# Patient Record
Sex: FEMALE | URBAN - NONMETROPOLITAN AREA
[De-identification: names, ages, dates, MRNs, and addresses within clinical notes are randomized per-mention and may not be internally consistent; named-entity substitution may affect disease eponyms.]

---

## 2024-07-03 ENCOUNTER — APPOINTMENT (RX ONLY)
Dept: URBAN - NONMETROPOLITAN AREA CLINIC 3 | Facility: CLINIC | Age: 74
Setting detail: DERMATOLOGY
End: 2024-07-03

## 2024-07-03 DIAGNOSIS — L81.4 OTHER MELANIN HYPERPIGMENTATION: ICD-10-CM

## 2024-07-03 DIAGNOSIS — L73.8 OTHER SPECIFIED FOLLICULAR DISORDERS: ICD-10-CM

## 2024-07-03 PROCEDURE — ? COUNSELING

## 2024-07-03 PROCEDURE — ? TREATMENT REGIMEN

## 2024-07-03 PROCEDURE — 99203 OFFICE O/P NEW LOW 30 MIN: CPT

## 2025-03-06 ENCOUNTER — HOSPITAL ENCOUNTER (OUTPATIENT)
Dept: RADIOLOGY | Facility: MEDICAL CENTER | Age: 75
End: 2025-03-06
Payer: COMMERCIAL

## 2025-03-07 ENCOUNTER — HOSPITAL ENCOUNTER (INPATIENT)
Facility: MEDICAL CENTER | Age: 75
LOS: 3 days | DRG: 603 | End: 2025-03-10
Attending: STUDENT IN AN ORGANIZED HEALTH CARE EDUCATION/TRAINING PROGRAM
Payer: MEDICARE

## 2025-03-07 DIAGNOSIS — R22.1 NECK SWELLING: ICD-10-CM

## 2025-03-07 DIAGNOSIS — L02.11 NECK ABSCESS: ICD-10-CM

## 2025-03-07 DIAGNOSIS — R22.1 NECK MASS: ICD-10-CM

## 2025-03-07 PROBLEM — G43.909 MIGRAINES: Status: ACTIVE | Noted: 2025-03-07

## 2025-03-07 PROBLEM — E03.9 HYPOTHYROIDISM: Status: ACTIVE | Noted: 2025-03-07

## 2025-03-07 LAB
ALBUMIN SERPL BCP-MCNC: 4.1 G/DL (ref 3.2–4.9)
ALBUMIN/GLOB SERPL: 1 G/DL
ALP SERPL-CCNC: 117 U/L (ref 30–99)
ALT SERPL-CCNC: 16 U/L (ref 2–50)
ANION GAP SERPL CALC-SCNC: 10 MMOL/L (ref 7–16)
APTT PPP: 27.6 SEC (ref 24.7–36)
AST SERPL-CCNC: 20 U/L (ref 12–45)
BASOPHILS # BLD AUTO: 0.5 % (ref 0–1.8)
BASOPHILS # BLD: 0.06 K/UL (ref 0–0.12)
BILIRUB SERPL-MCNC: 0.3 MG/DL (ref 0.1–1.5)
BUN SERPL-MCNC: 19 MG/DL (ref 8–22)
CALCIUM ALBUM COR SERPL-MCNC: 9.3 MG/DL (ref 8.5–10.5)
CALCIUM SERPL-MCNC: 9.4 MG/DL (ref 8.5–10.5)
CHLORIDE SERPL-SCNC: 105 MMOL/L (ref 96–112)
CO2 SERPL-SCNC: 25 MMOL/L (ref 20–33)
CREAT SERPL-MCNC: 0.71 MG/DL (ref 0.5–1.4)
EOSINOPHIL # BLD AUTO: 0.48 K/UL (ref 0–0.51)
EOSINOPHIL NFR BLD: 4.1 % (ref 0–6.9)
ERYTHROCYTE [DISTWIDTH] IN BLOOD BY AUTOMATED COUNT: 44.7 FL (ref 35.9–50)
GFR SERPLBLD CREATININE-BSD FMLA CKD-EPI: 89 ML/MIN/1.73 M 2
GLOBULIN SER CALC-MCNC: 4 G/DL (ref 1.9–3.5)
GLUCOSE SERPL-MCNC: 88 MG/DL (ref 65–99)
HCT VFR BLD AUTO: 42.5 % (ref 37–47)
HGB BLD-MCNC: 13.7 G/DL (ref 12–16)
IMM GRANULOCYTES # BLD AUTO: 0.05 K/UL (ref 0–0.11)
IMM GRANULOCYTES NFR BLD AUTO: 0.4 % (ref 0–0.9)
INR PPP: 1.14 (ref 0.87–1.13)
LACTATE SERPL-SCNC: 1 MMOL/L (ref 0.5–2)
LYMPHOCYTES # BLD AUTO: 1.76 K/UL (ref 1–4.8)
LYMPHOCYTES NFR BLD: 14.9 % (ref 22–41)
MCH RBC QN AUTO: 29.9 PG (ref 27–33)
MCHC RBC AUTO-ENTMCNC: 32.2 G/DL (ref 32.2–35.5)
MCV RBC AUTO: 92.8 FL (ref 81.4–97.8)
MONOCYTES # BLD AUTO: 0.93 K/UL (ref 0–0.85)
MONOCYTES NFR BLD AUTO: 7.9 % (ref 0–13.4)
NEUTROPHILS # BLD AUTO: 8.56 K/UL (ref 1.82–7.42)
NEUTROPHILS NFR BLD: 72.2 % (ref 44–72)
NRBC # BLD AUTO: 0 K/UL
NRBC BLD-RTO: 0 /100 WBC (ref 0–0.2)
PLATELET # BLD AUTO: 239 K/UL (ref 164–446)
PMV BLD AUTO: 9 FL (ref 9–12.9)
POTASSIUM SERPL-SCNC: 3.8 MMOL/L (ref 3.6–5.5)
PROT SERPL-MCNC: 8.1 G/DL (ref 6–8.2)
PROTHROMBIN TIME: 14.6 SEC (ref 12–14.6)
RBC # BLD AUTO: 4.58 M/UL (ref 4.2–5.4)
SODIUM SERPL-SCNC: 140 MMOL/L (ref 135–145)
WBC # BLD AUTO: 11.8 K/UL (ref 4.8–10.8)

## 2025-03-07 PROCEDURE — A9270 NON-COVERED ITEM OR SERVICE: HCPCS

## 2025-03-07 PROCEDURE — 36415 COLL VENOUS BLD VENIPUNCTURE: CPT

## 2025-03-07 PROCEDURE — 99285 EMERGENCY DEPT VISIT HI MDM: CPT

## 2025-03-07 PROCEDURE — 700111 HCHG RX REV CODE 636 W/ 250 OVERRIDE (IP): Performed by: STUDENT IN AN ORGANIZED HEALTH CARE EDUCATION/TRAINING PROGRAM

## 2025-03-07 PROCEDURE — 770020 HCHG ROOM/CARE - TELE (206)

## 2025-03-07 PROCEDURE — 83605 ASSAY OF LACTIC ACID: CPT

## 2025-03-07 PROCEDURE — 700102 HCHG RX REV CODE 250 W/ 637 OVERRIDE(OP)

## 2025-03-07 PROCEDURE — 85025 COMPLETE CBC W/AUTO DIFF WBC: CPT

## 2025-03-07 PROCEDURE — 85730 THROMBOPLASTIN TIME PARTIAL: CPT

## 2025-03-07 PROCEDURE — 99223 1ST HOSP IP/OBS HIGH 75: CPT | Mod: AI,GC

## 2025-03-07 PROCEDURE — 85610 PROTHROMBIN TIME: CPT

## 2025-03-07 PROCEDURE — 80053 COMPREHEN METABOLIC PANEL: CPT

## 2025-03-07 PROCEDURE — 96365 THER/PROPH/DIAG IV INF INIT: CPT

## 2025-03-07 RX ORDER — PROPRANOLOL HYDROCHLORIDE 10 MG/1
40 TABLET ORAL EVERY MORNING
Status: DISCONTINUED | OUTPATIENT
Start: 2025-03-08 | End: 2025-03-10 | Stop reason: HOSPADM

## 2025-03-07 RX ORDER — HYDROMORPHONE HYDROCHLORIDE 1 MG/ML
0.25 INJECTION, SOLUTION INTRAMUSCULAR; INTRAVENOUS; SUBCUTANEOUS
Status: DISCONTINUED | OUTPATIENT
Start: 2025-03-07 | End: 2025-03-07

## 2025-03-07 RX ORDER — CLINDAMYCIN PHOSPHATE 600 MG/50ML
600 INJECTION, SOLUTION INTRAVENOUS ONCE
Status: COMPLETED | OUTPATIENT
Start: 2025-03-07 | End: 2025-03-07

## 2025-03-07 RX ORDER — OXYCODONE HYDROCHLORIDE 5 MG/1
5 TABLET ORAL
Refills: 0 | Status: DISCONTINUED | OUTPATIENT
Start: 2025-03-07 | End: 2025-03-10 | Stop reason: HOSPADM

## 2025-03-07 RX ORDER — CLINDAMYCIN PHOSPHATE 600 MG/50ML
600 INJECTION, SOLUTION INTRAVENOUS EVERY 8 HOURS
Status: DISCONTINUED | OUTPATIENT
Start: 2025-03-08 | End: 2025-03-10 | Stop reason: HOSPADM

## 2025-03-07 RX ORDER — ACETAMINOPHEN 500 MG
1000 TABLET ORAL EVERY 6 HOURS PRN
Status: DISCONTINUED | OUTPATIENT
Start: 2025-03-13 | End: 2025-03-10 | Stop reason: HOSPADM

## 2025-03-07 RX ORDER — IBUPROFEN 800 MG/1
800 TABLET, FILM COATED ORAL 3 TIMES DAILY PRN
Status: DISCONTINUED | OUTPATIENT
Start: 2025-03-13 | End: 2025-03-07

## 2025-03-07 RX ORDER — IBUPROFEN 800 MG/1
800 TABLET, FILM COATED ORAL 3 TIMES DAILY
Status: DISCONTINUED | OUTPATIENT
Start: 2025-03-07 | End: 2025-03-09

## 2025-03-07 RX ORDER — ZOLMITRIPTAN 2.5 MG/1
5 TABLET, ORALLY DISINTEGRATING ORAL
Status: DISCONTINUED | OUTPATIENT
Start: 2025-03-07 | End: 2025-03-10 | Stop reason: HOSPADM

## 2025-03-07 RX ORDER — TOPIRAMATE 50 MG/1
50 TABLET, FILM COATED ORAL 2 TIMES DAILY
COMMUNITY

## 2025-03-07 RX ORDER — ACETAMINOPHEN 500 MG
1000 TABLET ORAL EVERY 6 HOURS PRN
Status: DISCONTINUED | OUTPATIENT
Start: 2025-03-13 | End: 2025-03-07

## 2025-03-07 RX ORDER — ZOLMITRIPTAN 5 MG/1
5 TABLET, FILM COATED ORAL
COMMUNITY

## 2025-03-07 RX ORDER — AMOXICILLIN 250 MG
2 CAPSULE ORAL EVERY EVENING
Status: DISCONTINUED | OUTPATIENT
Start: 2025-03-07 | End: 2025-03-10 | Stop reason: HOSPADM

## 2025-03-07 RX ORDER — ACETAMINOPHEN 500 MG
1000 TABLET ORAL EVERY 6 HOURS
Status: DISCONTINUED | OUTPATIENT
Start: 2025-03-08 | End: 2025-03-07

## 2025-03-07 RX ORDER — LABETALOL HYDROCHLORIDE 5 MG/ML
10 INJECTION, SOLUTION INTRAVENOUS EVERY 4 HOURS PRN
Status: DISCONTINUED | OUTPATIENT
Start: 2025-03-07 | End: 2025-03-10 | Stop reason: HOSPADM

## 2025-03-07 RX ORDER — ONDANSETRON 4 MG/1
4 TABLET, ORALLY DISINTEGRATING ORAL EVERY 4 HOURS PRN
Status: DISCONTINUED | OUTPATIENT
Start: 2025-03-07 | End: 2025-03-10 | Stop reason: HOSPADM

## 2025-03-07 RX ORDER — OMEGA-3 FATTY ACIDS/FISH OIL 300-1000MG
400-600 CAPSULE ORAL EVERY 6 HOURS PRN
COMMUNITY

## 2025-03-07 RX ORDER — HYDROMORPHONE HYDROCHLORIDE 1 MG/ML
0.25 INJECTION, SOLUTION INTRAMUSCULAR; INTRAVENOUS; SUBCUTANEOUS
Status: DISCONTINUED | OUTPATIENT
Start: 2025-03-07 | End: 2025-03-10 | Stop reason: HOSPADM

## 2025-03-07 RX ORDER — LEVOTHYROXINE SODIUM 50 UG/1
50 TABLET ORAL
Status: DISCONTINUED | OUTPATIENT
Start: 2025-03-08 | End: 2025-03-10 | Stop reason: HOSPADM

## 2025-03-07 RX ORDER — BUTALBITAL, ACETAMINOPHEN, CAFFEINE AND CODEINE PHOSPHATE 300; 50; 40; 30 MG/1; MG/1; MG/1; MG/1
1 CAPSULE ORAL EVERY 6 HOURS PRN
COMMUNITY

## 2025-03-07 RX ORDER — OXYCODONE HYDROCHLORIDE 5 MG/1
2.5 TABLET ORAL
Refills: 0 | Status: DISCONTINUED | OUTPATIENT
Start: 2025-03-07 | End: 2025-03-10 | Stop reason: HOSPADM

## 2025-03-07 RX ORDER — IBUPROFEN 800 MG/1
800 TABLET, FILM COATED ORAL 3 TIMES DAILY
Status: DISCONTINUED | OUTPATIENT
Start: 2025-03-08 | End: 2025-03-07

## 2025-03-07 RX ORDER — LEVOTHYROXINE SODIUM 50 UG/1
50 TABLET ORAL
COMMUNITY

## 2025-03-07 RX ORDER — IBUPROFEN 800 MG/1
800 TABLET, FILM COATED ORAL 3 TIMES DAILY PRN
Status: DISCONTINUED | OUTPATIENT
Start: 2025-03-12 | End: 2025-03-09

## 2025-03-07 RX ORDER — PROPRANOLOL HYDROCHLORIDE 40 MG/1
40-80 TABLET ORAL 2 TIMES DAILY
Status: DISCONTINUED | OUTPATIENT
Start: 2025-03-07 | End: 2025-03-07

## 2025-03-07 RX ORDER — ONDANSETRON 2 MG/ML
4 INJECTION INTRAMUSCULAR; INTRAVENOUS EVERY 4 HOURS PRN
Status: DISCONTINUED | OUTPATIENT
Start: 2025-03-07 | End: 2025-03-10 | Stop reason: HOSPADM

## 2025-03-07 RX ORDER — TOPIRAMATE 25 MG/1
50 TABLET, FILM COATED ORAL 2 TIMES DAILY
Status: DISCONTINUED | OUTPATIENT
Start: 2025-03-07 | End: 2025-03-10 | Stop reason: HOSPADM

## 2025-03-07 RX ORDER — ACETAMINOPHEN 500 MG
1000 TABLET ORAL EVERY 6 HOURS
Status: DISCONTINUED | OUTPATIENT
Start: 2025-03-08 | End: 2025-03-10 | Stop reason: HOSPADM

## 2025-03-07 RX ORDER — OXYCODONE HYDROCHLORIDE 5 MG/1
2.5 TABLET ORAL
Refills: 0 | Status: DISCONTINUED | OUTPATIENT
Start: 2025-03-07 | End: 2025-03-07

## 2025-03-07 RX ORDER — PROPRANOLOL HYDROCHLORIDE 40 MG/1
40-80 TABLET ORAL 2 TIMES DAILY
COMMUNITY

## 2025-03-07 RX ORDER — ACETAMINOPHEN 325 MG/1
650 TABLET ORAL EVERY 6 HOURS PRN
Status: DISCONTINUED | OUTPATIENT
Start: 2025-03-07 | End: 2025-03-07

## 2025-03-07 RX ORDER — POLYETHYLENE GLYCOL 3350 17 G/17G
1 POWDER, FOR SOLUTION ORAL
Status: DISCONTINUED | OUTPATIENT
Start: 2025-03-07 | End: 2025-03-10 | Stop reason: HOSPADM

## 2025-03-07 RX ORDER — PROPRANOLOL HYDROCHLORIDE 40 MG/1
80 TABLET ORAL EVERY EVENING
Status: DISCONTINUED | OUTPATIENT
Start: 2025-03-07 | End: 2025-03-10 | Stop reason: HOSPADM

## 2025-03-07 RX ORDER — ACETAMINOPHEN/DIPHENHYDRAMINE 500MG-25MG
2 TABLET ORAL
COMMUNITY

## 2025-03-07 RX ORDER — OXYCODONE HYDROCHLORIDE 5 MG/1
5 TABLET ORAL
Refills: 0 | Status: DISCONTINUED | OUTPATIENT
Start: 2025-03-07 | End: 2025-03-07

## 2025-03-07 RX ADMIN — CLINDAMYCIN IN 5 PERCENT DEXTROSE 600 MG: 12 INJECTION, SOLUTION INTRAVENOUS at 19:05

## 2025-03-07 RX ADMIN — ACETAMINOPHEN 650 MG: 325 TABLET ORAL at 21:21

## 2025-03-07 RX ADMIN — TOPIRAMATE 50 MG: 25 TABLET, FILM COATED ORAL at 21:30

## 2025-03-07 RX ADMIN — OXYCODONE 5 MG: 5 TABLET ORAL at 23:49

## 2025-03-07 RX ADMIN — PROPRANOLOL HYDROCHLORIDE 80 MG: 40 TABLET ORAL at 21:29

## 2025-03-07 SDOH — ECONOMIC STABILITY: TRANSPORTATION INSECURITY
IN THE PAST 12 MONTHS, HAS LACK OF RELIABLE TRANSPORTATION KEPT YOU FROM MEDICAL APPOINTMENTS, MEETINGS, WORK OR FROM GETTING THINGS NEEDED FOR DAILY LIVING?: YES

## 2025-03-07 SDOH — ECONOMIC STABILITY: TRANSPORTATION INSECURITY
IN THE PAST 12 MONTHS, HAS THE LACK OF TRANSPORTATION KEPT YOU FROM MEDICAL APPOINTMENTS OR FROM GETTING MEDICATIONS?: YES

## 2025-03-07 ASSESSMENT — COGNITIVE AND FUNCTIONAL STATUS - GENERAL
SUGGESTED CMS G CODE MODIFIER MOBILITY: CH
MOBILITY SCORE: 24
DAILY ACTIVITIY SCORE: 24
SUGGESTED CMS G CODE MODIFIER DAILY ACTIVITY: CH

## 2025-03-07 ASSESSMENT — ENCOUNTER SYMPTOMS
NAUSEA: 0
SHORTNESS OF BREATH: 0
DEPRESSION: 0
BACK PAIN: 0
COUGH: 0
DIARRHEA: 0
MYALGIAS: 0
ORTHOPNEA: 0
VOMITING: 0
CHILLS: 0
TREMORS: 0
HEMOPTYSIS: 0
HEADACHES: 0
NECK PAIN: 1
WEIGHT LOSS: 0
SPUTUM PRODUCTION: 0
ABDOMINAL PAIN: 0
DOUBLE VISION: 0
PHOTOPHOBIA: 0
CLAUDICATION: 0
BLURRED VISION: 0
HEARTBURN: 0
DIZZINESS: 0
FEVER: 0
TINGLING: 0
PALPITATIONS: 0

## 2025-03-07 ASSESSMENT — LIFESTYLE VARIABLES
ALCOHOL_USE: NO
EVER HAD A DRINK FIRST THING IN THE MORNING TO STEADY YOUR NERVES TO GET RID OF A HANGOVER: NO
HAVE PEOPLE ANNOYED YOU BY CRITICIZING YOUR DRINKING: NO
TOTAL SCORE: 0
HAVE YOU EVER FELT YOU SHOULD CUT DOWN ON YOUR DRINKING: NO
HAVE YOU EVER FELT YOU SHOULD CUT DOWN ON YOUR DRINKING: NO
TOTAL SCORE: 0
TOTAL SCORE: 0
DOES PATIENT WANT TO STOP DRINKING: NO
ON A TYPICAL DAY WHEN YOU DRINK ALCOHOL HOW MANY DRINKS DO YOU HAVE: 0
DOES PATIENT WANT TO STOP DRINKING: NO
ON A TYPICAL DAY WHEN YOU DRINK ALCOHOL HOW MANY DRINKS DO YOU HAVE: 0
AVERAGE NUMBER OF DAYS PER WEEK YOU HAVE A DRINK CONTAINING ALCOHOL: 0
ALCOHOL_USE: NO
TOTAL SCORE: 0
EVER FELT BAD OR GUILTY ABOUT YOUR DRINKING: NO
EVER FELT BAD OR GUILTY ABOUT YOUR DRINKING: NO
TOTAL SCORE: 0
HAVE PEOPLE ANNOYED YOU BY CRITICIZING YOUR DRINKING: NO
HOW MANY TIMES IN THE PAST YEAR HAVE YOU HAD 5 OR MORE DRINKS IN A DAY: 0
AVERAGE NUMBER OF DAYS PER WEEK YOU HAVE A DRINK CONTAINING ALCOHOL: 0
CONSUMPTION TOTAL: INCOMPLETE
EVER HAD A DRINK FIRST THING IN THE MORNING TO STEADY YOUR NERVES TO GET RID OF A HANGOVER: NO
TOTAL SCORE: 0
CONSUMPTION TOTAL: NEGATIVE

## 2025-03-07 ASSESSMENT — SOCIAL DETERMINANTS OF HEALTH (SDOH)
WITHIN THE PAST 12 MONTHS, YOU WORRIED THAT YOUR FOOD WOULD RUN OUT BEFORE YOU GOT THE MONEY TO BUY MORE: NEVER TRUE
WITHIN THE LAST YEAR, HAVE YOU BEEN AFRAID OF YOUR PARTNER OR EX-PARTNER?: NO
WITHIN THE PAST 12 MONTHS, THE FOOD YOU BOUGHT JUST DIDN'T LAST AND YOU DIDN'T HAVE MONEY TO GET MORE: NEVER TRUE
WITHIN THE LAST YEAR, HAVE TO BEEN RAPED OR FORCED TO HAVE ANY KIND OF SEXUAL ACTIVITY BY YOUR PARTNER OR EX-PARTNER?: NO
WITHIN THE LAST YEAR, HAVE YOU BEEN HUMILIATED OR EMOTIONALLY ABUSED IN OTHER WAYS BY YOUR PARTNER OR EX-PARTNER?: NO
WITHIN THE LAST YEAR, HAVE YOU BEEN KICKED, HIT, SLAPPED, OR OTHERWISE PHYSICALLY HURT BY YOUR PARTNER OR EX-PARTNER?: NO
IN THE PAST 12 MONTHS, HAS THE ELECTRIC, GAS, OIL, OR WATER COMPANY THREATENED TO SHUT OFF SERVICE IN YOUR HOME?: NO

## 2025-03-07 ASSESSMENT — PAIN DESCRIPTION - PAIN TYPE
TYPE: ACUTE PAIN

## 2025-03-07 ASSESSMENT — FIBROSIS 4 INDEX: FIB4 SCORE: 1.55

## 2025-03-07 ASSESSMENT — PATIENT HEALTH QUESTIONNAIRE - PHQ9
1. LITTLE INTEREST OR PLEASURE IN DOING THINGS: NOT AT ALL
SUM OF ALL RESPONSES TO PHQ9 QUESTIONS 1 AND 2: 0
2. FEELING DOWN, DEPRESSED, IRRITABLE, OR HOPELESS: NOT AT ALL

## 2025-03-08 LAB
ALBUMIN SERPL BCP-MCNC: 3.7 G/DL (ref 3.2–4.9)
ALBUMIN/GLOB SERPL: 1 G/DL
ALP SERPL-CCNC: 122 U/L (ref 30–99)
ALT SERPL-CCNC: 12 U/L (ref 2–50)
ANION GAP SERPL CALC-SCNC: 11 MMOL/L (ref 7–16)
AST SERPL-CCNC: 21 U/L (ref 12–45)
BASOPHILS # BLD AUTO: 0.6 % (ref 0–1.8)
BASOPHILS # BLD: 0.07 K/UL (ref 0–0.12)
BILIRUB SERPL-MCNC: 0.3 MG/DL (ref 0.1–1.5)
BUN SERPL-MCNC: 17 MG/DL (ref 8–22)
CALCIUM ALBUM COR SERPL-MCNC: 9.2 MG/DL (ref 8.5–10.5)
CALCIUM SERPL-MCNC: 9 MG/DL (ref 8.5–10.5)
CHLORIDE SERPL-SCNC: 106 MMOL/L (ref 96–112)
CO2 SERPL-SCNC: 22 MMOL/L (ref 20–33)
CREAT SERPL-MCNC: 0.66 MG/DL (ref 0.5–1.4)
EOSINOPHIL # BLD AUTO: 0.37 K/UL (ref 0–0.51)
EOSINOPHIL NFR BLD: 3.1 % (ref 0–6.9)
ERYTHROCYTE [DISTWIDTH] IN BLOOD BY AUTOMATED COUNT: 43.4 FL (ref 35.9–50)
GFR SERPLBLD CREATININE-BSD FMLA CKD-EPI: 91 ML/MIN/1.73 M 2
GLOBULIN SER CALC-MCNC: 3.8 G/DL (ref 1.9–3.5)
GLUCOSE SERPL-MCNC: 113 MG/DL (ref 65–99)
HCT VFR BLD AUTO: 38.6 % (ref 37–47)
HGB BLD-MCNC: 12.9 G/DL (ref 12–16)
IMM GRANULOCYTES # BLD AUTO: 0.07 K/UL (ref 0–0.11)
IMM GRANULOCYTES NFR BLD AUTO: 0.6 % (ref 0–0.9)
LYMPHOCYTES # BLD AUTO: 1.23 K/UL (ref 1–4.8)
LYMPHOCYTES NFR BLD: 10.4 % (ref 22–41)
MCH RBC QN AUTO: 30.1 PG (ref 27–33)
MCHC RBC AUTO-ENTMCNC: 33.4 G/DL (ref 32.2–35.5)
MCV RBC AUTO: 90.2 FL (ref 81.4–97.8)
MONOCYTES # BLD AUTO: 0.86 K/UL (ref 0–0.85)
MONOCYTES NFR BLD AUTO: 7.3 % (ref 0–13.4)
NEUTROPHILS # BLD AUTO: 9.18 K/UL (ref 1.82–7.42)
NEUTROPHILS NFR BLD: 78 % (ref 44–72)
NRBC # BLD AUTO: 0 K/UL
NRBC BLD-RTO: 0 /100 WBC (ref 0–0.2)
PLATELET # BLD AUTO: 193 K/UL (ref 164–446)
PMV BLD AUTO: 9.2 FL (ref 9–12.9)
POTASSIUM SERPL-SCNC: 3.8 MMOL/L (ref 3.6–5.5)
PROT SERPL-MCNC: 7.5 G/DL (ref 6–8.2)
RBC # BLD AUTO: 4.28 M/UL (ref 4.2–5.4)
SODIUM SERPL-SCNC: 139 MMOL/L (ref 135–145)
WBC # BLD AUTO: 11.8 K/UL (ref 4.8–10.8)

## 2025-03-08 PROCEDURE — 770001 HCHG ROOM/CARE - MED/SURG/GYN PRIV*

## 2025-03-08 PROCEDURE — 700111 HCHG RX REV CODE 636 W/ 250 OVERRIDE (IP)

## 2025-03-08 PROCEDURE — 99232 SBSQ HOSP IP/OBS MODERATE 35: CPT | Performed by: INTERNAL MEDICINE

## 2025-03-08 PROCEDURE — 85025 COMPLETE CBC W/AUTO DIFF WBC: CPT

## 2025-03-08 PROCEDURE — 700105 HCHG RX REV CODE 258

## 2025-03-08 PROCEDURE — 700102 HCHG RX REV CODE 250 W/ 637 OVERRIDE(OP)

## 2025-03-08 PROCEDURE — 36415 COLL VENOUS BLD VENIPUNCTURE: CPT

## 2025-03-08 PROCEDURE — 80053 COMPREHEN METABOLIC PANEL: CPT

## 2025-03-08 PROCEDURE — A9270 NON-COVERED ITEM OR SERVICE: HCPCS

## 2025-03-08 RX ORDER — SODIUM CHLORIDE, SODIUM LACTATE, POTASSIUM CHLORIDE, CALCIUM CHLORIDE 600; 310; 30; 20 MG/100ML; MG/100ML; MG/100ML; MG/100ML
INJECTION, SOLUTION INTRAVENOUS CONTINUOUS
Status: DISCONTINUED | OUTPATIENT
Start: 2025-03-08 | End: 2025-03-08

## 2025-03-08 RX ADMIN — ACETAMINOPHEN 1000 MG: 500 TABLET ORAL at 05:46

## 2025-03-08 RX ADMIN — CLINDAMYCIN IN 5 PERCENT DEXTROSE 600 MG: 12 INJECTION, SOLUTION INTRAVENOUS at 05:59

## 2025-03-08 RX ADMIN — CLINDAMYCIN IN 5 PERCENT DEXTROSE 600 MG: 12 INJECTION, SOLUTION INTRAVENOUS at 22:16

## 2025-03-08 RX ADMIN — TOPIRAMATE 50 MG: 25 TABLET, FILM COATED ORAL at 17:49

## 2025-03-08 RX ADMIN — PROPRANOLOL HYDROCHLORIDE 80 MG: 40 TABLET ORAL at 17:48

## 2025-03-08 RX ADMIN — HYDROMORPHONE HYDROCHLORIDE 0.25 MG: 1 INJECTION, SOLUTION INTRAMUSCULAR; INTRAVENOUS; SUBCUTANEOUS at 02:05

## 2025-03-08 RX ADMIN — TOPIRAMATE 50 MG: 25 TABLET, FILM COATED ORAL at 05:47

## 2025-03-08 RX ADMIN — SODIUM CHLORIDE, POTASSIUM CHLORIDE, SODIUM LACTATE AND CALCIUM CHLORIDE: 600; 310; 30; 20 INJECTION, SOLUTION INTRAVENOUS at 10:00

## 2025-03-08 RX ADMIN — IBUPROFEN 800 MG: 800 TABLET, FILM COATED ORAL at 16:27

## 2025-03-08 RX ADMIN — PROPRANOLOL HYDROCHLORIDE 40 MG: 10 TABLET ORAL at 05:49

## 2025-03-08 RX ADMIN — IBUPROFEN 800 MG: 800 TABLET, FILM COATED ORAL at 09:00

## 2025-03-08 RX ADMIN — CLINDAMYCIN IN 5 PERCENT DEXTROSE 600 MG: 12 INJECTION, SOLUTION INTRAVENOUS at 14:51

## 2025-03-08 RX ADMIN — LEVOTHYROXINE SODIUM 50 MCG: 0.05 TABLET ORAL at 05:49

## 2025-03-08 RX ADMIN — IBUPROFEN 800 MG: 800 TABLET, FILM COATED ORAL at 03:37

## 2025-03-08 RX ADMIN — ACETAMINOPHEN 1000 MG: 500 TABLET ORAL at 23:17

## 2025-03-08 RX ADMIN — IBUPROFEN 800 MG: 800 TABLET, FILM COATED ORAL at 23:17

## 2025-03-08 SDOH — ECONOMIC STABILITY: TRANSPORTATION INSECURITY
IN THE PAST 12 MONTHS, HAS THE LACK OF TRANSPORTATION KEPT YOU FROM MEDICAL APPOINTMENTS OR FROM GETTING MEDICATIONS?: NO

## 2025-03-08 SDOH — ECONOMIC STABILITY: TRANSPORTATION INSECURITY
IN THE PAST 12 MONTHS, HAS LACK OF RELIABLE TRANSPORTATION KEPT YOU FROM MEDICAL APPOINTMENTS, MEETINGS, WORK OR FROM GETTING THINGS NEEDED FOR DAILY LIVING?: NO

## 2025-03-08 ASSESSMENT — SOCIAL DETERMINANTS OF HEALTH (SDOH)
IN THE PAST 12 MONTHS, HAS THE ELECTRIC, GAS, OIL, OR WATER COMPANY THREATENED TO SHUT OFF SERVICE IN YOUR HOME?: NO
WITHIN THE PAST 12 MONTHS, THE FOOD YOU BOUGHT JUST DIDN'T LAST AND YOU DIDN'T HAVE MONEY TO GET MORE: NEVER TRUE
WITHIN THE PAST 12 MONTHS, YOU WORRIED THAT YOUR FOOD WOULD RUN OUT BEFORE YOU GOT THE MONEY TO BUY MORE: NEVER TRUE

## 2025-03-08 ASSESSMENT — ENCOUNTER SYMPTOMS
PSYCHIATRIC NEGATIVE: 1
FEVER: 0
GASTROINTESTINAL NEGATIVE: 1
CONSTITUTIONAL NEGATIVE: 1
CHILLS: 0
RESPIRATORY NEGATIVE: 1
NEUROLOGICAL NEGATIVE: 1
CARDIOVASCULAR NEGATIVE: 1
SINUS PAIN: 0
EYES NEGATIVE: 1
MUSCULOSKELETAL NEGATIVE: 1
SORE THROAT: 1
WEIGHT LOSS: 0

## 2025-03-08 ASSESSMENT — FIBROSIS 4 INDEX: FIB4 SCORE: 2.32

## 2025-03-08 ASSESSMENT — PAIN DESCRIPTION - PAIN TYPE
TYPE: ACUTE PAIN
TYPE: CHRONIC PAIN

## 2025-03-08 NOTE — ASSESSMENT & PLAN NOTE
Outside CT scan and Ultrasound of the neck describing a circumscribed mass lesion with central fluid which could be a abscess versus lymph node versus cancer  ENT (Dr. Fernandes) consulted  -Continue clindamycin for now  -Plan for FNA biopsy on 3/10.  If neoplasm ruled out, ENT will consider I&D  -Pain control

## 2025-03-08 NOTE — PROGRESS NOTES
Patient transferred from ED via gurney. Patient alert and oriented x 4, on RA. Admission assessment and 4 eyes completed with Kristen WALTON , RN. VS stable, tele monitor on. All fall precautions in place. Bed locked and on lowest position. Call light and personal belongings within reach. Patient updated regarding plan of care. All needs tended,

## 2025-03-08 NOTE — PROGRESS NOTES
[facility] Internal Medicine Daily Progress Note    Date of Service  3/8/2025    [facility] Team: [facility] Team   Attending: Titus Nieves M.d.  Senior Resident: Dr. Arsen Carrillo  Intern:  Dr. Marina Evangelista  Contact Number: [phone]    Chief Complaint  Marilyn Feliciano is a 74 y.o. female admitted 3/7/2025 with suspected neck abscess    Hospital Course  Pt is a 74/F with a PMH of hypothyroidism on synthroid who presents for a right-sided neck mass. She was evaluated at [facility] ED a few days ago for a mass that progressively grew on the right side of her neck for approximately 2 weeks. US and CT neck done there showed a possible neck abscess versus neoplasm. She was started on Unasyn then referred to [facility] for further workup from an ENT provider. While in our ED she was started on Clindamycin due to a Unasyn shortage and an ENT provider was consulted by the ED physician. WBC was slightly elevated at 11.8.    Interval Problem Update  Currently, pt complains of neck pain that is tender to touch. She complains of a mild sore throat and decreased appetite, but she denies fevers, difficulty swallowing or drinking, recent staph infection, weight loss, difficulty opening her mouth, tooth infections, or decreased range of motion of her neck.  - Dr. Fernandes consulted by ED provider who plans to see the pt today. He recommended for us to get workup started with an IR-guided FNAB, which will most likely be done on Monday due to no Pathologist on weekends.  - For now continue Clindamycin 600mg q8  - Regular diet for now until NPO on Sunday midnight    I have discussed this patient's plan of care and discharge plan at IDT rounds today with Case Management, Nursing, Nursing leadership, and other members of the IDT team.    Consultants/Specialty  N/A    Code Status  Full Code    Disposition  The patient is not medically cleared for discharge to home or a post-acute facility.      I have placed the appropriate orders for  post-discharge needs.    Review of Systems  Review of Systems   Constitutional: Negative.  Negative for chills, fever, malaise/fatigue and weight loss.   HENT:  Positive for sore throat. Negative for sinus pain.    Eyes: Negative.    Respiratory: Negative.     Cardiovascular: Negative.    Gastrointestinal: Negative.    Genitourinary: Negative.    Musculoskeletal: Negative.    Skin: Negative.    Neurological: Negative.    Endo/Heme/Allergies: Negative.    Psychiatric/Behavioral: Negative.          Physical Exam  Temp:  [36.1 °C (97 °F)-36.5 °C (97.7 °F)] 36.1 °C (97 °F)  Pulse:  [60-69] 60  Resp:  [16-18] 18  BP: (115-156)/(55-83) 156/74  SpO2:  [94 %-99 %] 97 %    Physical Exam  HENT:      Head: Normocephalic.      Right Ear: External ear normal.      Left Ear: External ear normal.      Mouth/Throat:      Mouth: Mucous membranes are moist.      Pharynx: No oropharyngeal exudate or posterior oropharyngeal erythema.   Neck:      Comments: Tender area from anterolateral aspect of right side of the neck, extending to behind the ear that spans from her submandibular area down to right clavicle. (+) induration. No sinus tracts inspected  Cardiovascular:      Rate and Rhythm: Normal rate and regular rhythm.   Pulmonary:      Breath sounds: Normal breath sounds.   Abdominal:      General: Abdomen is flat.      Palpations: Abdomen is soft.   Musculoskeletal:         General: Normal range of motion.      Cervical back: Normal range of motion. Tenderness present.   Lymphadenopathy:      Cervical: No cervical adenopathy.   Skin:     Findings: Erythema present.   Neurological:      Mental Status: She is alert and oriented to person, place, and time.   Psychiatric:         Mood and Affect: Mood normal.         Fluids    Intake/Output Summary (Last 24 hours) at 3/8/2025 1708  Last data filed at 3/8/2025 0600  Gross per 24 hour   Intake 50 ml   Output --   Net 50 ml       Laboratory  Recent Labs     03/07/25  1853 03/08/25  7393    WBC 11.8* 11.8*   RBC 4.58 4.28   HEMOGLOBIN 13.7 12.9   HEMATOCRIT 42.5 38.6   MCV 92.8 90.2   MCH 29.9 30.1   MCHC 32.2 33.4   RDW 44.7 43.4   PLATELETCT 239 193   MPV 9.0 9.2     Recent Labs     03/07/25  1853 03/08/25  0248   SODIUM 140 139   POTASSIUM 3.8 3.8   CHLORIDE 105 106   CO2 25 22   GLUCOSE 88 113*   BUN 19 17   CREATININE 0.71 0.66   CALCIUM 9.4 9.0     Recent Labs     03/07/25  1853   APTT 27.6   INR 1.14*               Imaging  [id number] FILM ULTRASOUND   Final Result      CT-OUTSIDE IMAGES-CT NECK   Final Result      IR-FNA BIOPSY W/ FLUORO GUIDANCE    (Results Pending)        Assessment/Plan  Problem Representation:    * Neck mass- (present on admission)  Assessment & Plan  Outside CT scan and Ultrasound of the neck describing a circumscribed mass lesion with central fluid which could be a abscess versus lymph node versus cancer  - ENT (Dr. Fernandes) following who plans to see the pt today. He recommended for us to get workup started with an IR-guided FNAB, which will most likely be done on Monday due to no Pathologist on weekends.  - For now continue Clindamycin 600mg q8 (due to Unasyn shortage)  - Regular diet for now until NPO on Sunday midnight  - Plan for IR-guided FNAB on Monday    Migraines  Assessment & Plan  Patient not presenting with any migraines  - Continue home pain medications    Hypothyroidism  Assessment & Plan  - Continue home Levothyroxine 50mcg         VTE prophylaxis: SCDs/TEDs    I have performed a physical exam and reviewed and updated ROS and Plan today (3/8/2025). In review of yesterday's note (3/7/2025), there are no changes except as documented above.

## 2025-03-08 NOTE — ED NOTES
Med rec completed per patient at bedside. Dispense history via Surescripts not available at time of med rec.    Allergies reviewed with patient.    Outpatient antibiotics within the last 30 days: NONE.    ANTICOAGULANTS: NONE.    Preferred pharmacy: [facility] in [place].

## 2025-03-08 NOTE — PROGRESS NOTES
Telemetry Report:      Rhythm:     SB/SR  Heart Rate: 58-67  Ectopy:      Rare PVC, RARE PAC       NV:  0. 15          QRS:       0.08  QT:   0.43        Per Telestrip from Monitor Room

## 2025-03-08 NOTE — H&P
[facility] Internal Medicine History & Physical Note    Date of Service  3/7/2025    Attending: Shayan Antony D.o.  Senior Resident: Delgado Pearl   Contact Number: [phone]    Primary Care Physician  No primary care provider on file.    Consultants  ENT      Code Status  Full Code    Chief Complaint  Chief Complaint   Patient presents with    Sent by MD [facility]AB[facility] Jimenez transfer from last night for ENT follow up. Neck swelling for 2 weeks.        History of Presenting Illness (HPI):   Marilyn Feliciano is a 74 y.o. female who presented 3/7/2025 with past medical history of migraines, hypothyroidism.  The patient came as a transfer from [place] due to presenting a mass over the right side of her neck, the patient states that the mass started increasing in size approximately 2 weeks ago, and states that initially she had a cold back in November 2024 and she had some lymph nodes inflammation at that time which never returned to the normal size.  The patient states some tenderness to the palpation over the mass and some discomfort whenever she extends her neck otherwise the patient denies any other symptoms like shortness of breath, trouble with swallowing, fever, chills, nausea, vomiting.    The patient was evaluated in the ED:   CBC with white blood cell 11.8, neutrophils 72  -Outside CT scan and Ultrasound of the neck describing a circumscribed mass lesion with central fluid which could be a abscess versus lymph node versus cancer.    ED doctor discussed with ENT specialist which will come to evaluate the patient most likely during a.m., also recommended to start on antibiotics he was started on clindamycin (due to Unasyn shortage)    I discussed the plan of care with patient.    Review of Systems  Review of Systems   Constitutional:  Negative for chills, fever, malaise/fatigue and weight loss.   HENT:  Negative for ear discharge, ear pain, hearing loss, nosebleeds and tinnitus.    Eyes:  Negative for blurred  vision, double vision and photophobia.   Respiratory:  Negative for cough, hemoptysis, sputum production and shortness of breath.    Cardiovascular:  Negative for chest pain, palpitations, orthopnea and claudication.   Gastrointestinal:  Negative for abdominal pain, diarrhea, heartburn, nausea and vomiting.   Genitourinary:  Negative for dysuria, frequency and urgency.   Musculoskeletal:  Positive for neck pain (in the right side of the neck due to the mass). Negative for back pain, joint pain and myalgias.   Neurological:  Negative for dizziness, tingling, tremors and headaches.   Psychiatric/Behavioral:  Negative for depression.        Past Medical History   has no past medical history on file.    Surgical History   has no past surgical history on file.     Family History  family history is not on file.   Family history reviewed with patient.       Allergies  No Known Allergies    Medications  Prior to Admission Medications   Prescriptions Last Dose Informant Patient Reported? Taking?   Butalbital-APAP-Caff-Cod (FIORICET/CODEINE) -94-30 MG Cap 3/7/2025 at 12:00 AM Patient Yes Yes   Sig: Take 1 Capsule by mouth every 6 hours as needed (Migraine).   Ibuprofen (ADVIL LIQUI-GELS MINIS) 200 MG Cap 3/7/2025 at 12:00 PM Patient Yes Yes   Sig: Take 400-600 mg by mouth every 6 hours as needed (Mild to Moderate Pain). 2 to 3 capsules = 400 to 600 mg.   diphenhydrAMINE-APAP, sleep, (TYLENOL PM EXTRA STRENGTH)  MG Tab 3/5/2025 Evening Patient Yes Yes   Sig: Take 2 Tablets by mouth at bedtime as needed (Mild Pain, Sleep).   levothyroxine (SYNTHROID) 50 MCG Tab 3/7/2025 at  9:00 AM Patient Yes Yes   Sig: Take 50 mcg by mouth every morning on an empty stomach.   propranolol (INDERAL) 40 MG Tab 3/7/2025 at  9:00 AM Patient Yes Yes   Sig: Take 40-80 mg by mouth 2 times a day. Take 1 tablet (40 mg) in the morning and 2 tablets (80 mg) in the evening.   topiramate (TOPAMAX) 50 MG tablet 3/7/2025 at  9:00 AM Patient Yes  Yes   Sig: Take 50 mg by mouth 2 times a day.   zolmitriptan (ZOMIG) 5 MG tablet 3/5/2025 Patient Yes Yes   Sig: Take 5 mg by mouth 1 time a day as needed for Migraine.      Facility-Administered Medications: None       Physical Exam  Temp:  [36.4 °C (97.6 °F)] 36.4 °C (97.6 °F)  Pulse:  [60-66] 65  Resp:  [16-18] 16  BP: (126-133)/(70-77) 126/77  SpO2:  [97 %-99 %] 99 %  Blood Pressure : 129/73   Temperature: 36.4 °C (97.6 °F)   Pulse: 60   Respiration: 18   Pulse Oximetry: 97 %       Physical Exam  Constitutional:       General: She is not in acute distress.     Appearance: She is not ill-appearing.   Eyes:      General:         Right eye: No discharge.         Left eye: No discharge.   Neck:      Vascular: No carotid bruit.   Cardiovascular:      Rate and Rhythm: Normal rate.      Heart sounds: No murmur heard.     No friction rub. No gallop.   Pulmonary:      Effort: No respiratory distress.      Breath sounds: No stridor. No wheezing.   Abdominal:      General: There is no distension.      Palpations: There is no mass.      Tenderness: There is no abdominal tenderness.   Musculoskeletal:         General: No swelling, tenderness, deformity or signs of injury.      Cervical back: No rigidity. Tenderness: over right jimi eof her neck to extension and deep palpation.     Right lower leg: No edema.      Left lower leg: No edema.   Lymphadenopathy:      Cervical: No cervical adenopathy.   Skin:     Capillary Refill: Capillary refill takes less than 2 seconds.      Coloration: Skin is not jaundiced or pale.      Findings: No bruising.   Neurological:      Mental Status: She is oriented to person, place, and time.         Laboratory:  Recent Labs     03/07/25  1853   WBC 11.8*   RBC 4.58   HEMOGLOBIN 13.7   HEMATOCRIT 42.5   MCV 92.8   MCH 29.9   MCHC 32.2   RDW 44.7   PLATELETCT 239   MPV 9.0     Recent Labs     03/07/25  1853   SODIUM 140   POTASSIUM 3.8   CHLORIDE 105   CO2 25   GLUCOSE 88   BUN 19   CREATININE 0.71  "  CALCIUM 9.4     Recent Labs     03/07/25  1853   ALTSGPT 16   ASTSGOT 20   ALKPHOSPHAT 117*   TBILIRUBIN 0.3   GLUCOSE 88     Recent Labs     03/07/25  1853   APTT 27.6   INR 1.14*     No results for input(s): \"NTPROBNP\" in the last 72 hours.      No results for input(s): \"TROPONINT\" in the last 72 hours.    Imaging:  [id number] FILM ULTRASOUND   Final Result      CT-OUTSIDE IMAGES-CT NECK   Final Result          X-Ray:  I have personally reviewed the images and compared with prior images.    Assessment/Plan:  Problem Representation:   I anticipate this patient will require at least two midnights for appropriate medical management, necessitating inpatient admission because neck mass    Patient will need a Telemetry bed on MEDICAL service .  The need is secondary to neck mass.    * Neck mass- (present on admission)  Assessment & Plan  Outside CT scan and Ultrasound of the neck describing a circumscribed mass lesion with central fluid which could be a abscess versus lymph node versus cancer  -ENT evaluation a.m.  -We will start her on clindamycin (due to antibiotics shortage)  -Telemetry evaluating O2 saturation    Hypothyroidism  Assessment & Plan  Continue home medication which levothyroxine      Migraines  Assessment & Plan  Patient not presenting any migraines at this point restart home medications        VTE prophylaxis: SCDs/TEDs    "

## 2025-03-08 NOTE — CARE PLAN
The patient is Stable - Low risk of patient condition declining or worsening    Shift Goals  Clinical Goals: ENT referral, pain management  Patient Goals: plan of care, updates  Family Goals: updtaes    Progress made toward(s) clinical / shift goals:     Problem: Pain - Standard  Goal: Alleviation of pain or a reduction in pain to the patient’s comfort goal  Outcome: Progressing  Note: Document pain using the appropriate pain scale per order or unit policy   Educate and implement non-pharmacologic comfort measures (i.e. relaxation, distraction, massage, cold/heat therapy, etc.)   Pain management medications as ordered   Reassess pain after pain med administration per policy   If opiods administered assess patient's response to pain medication is appropriate per POSS sedation scale   Follow pain management plan developed in collaboration with patient and interdisciplinary team     Patient reported right neck pain, Oxycodone PRN and Dilaudid PRN as ordered. Encouraged non pharmacological intervention.      Problem: Knowledge Deficit - Standard  Goal: Patient and family/care givers will demonstrate understanding of plan of care, disease process/condition, diagnostic tests and medications  Outcome: Progressing  Note: Patient updated regarding plan of care and current treatment. All questions answered. Pt voiced understanding.          Patient is not progressing towards the following goals:

## 2025-03-08 NOTE — ED TRIAGE NOTES
Chief Complaint   Patient presents with    Sent by MD BABS Jimenez transfer from last night for ENT follow up. Neck swelling for 2 weeks.      Pt ambulatory to triage for above complaint. Transfer notes report neck abscess. Pt denies difficulty swallowing or maintaining airway at this time. Pt took ibuprofen prior to arrival and reports pain is well controlled.

## 2025-03-08 NOTE — PROGRESS NOTES
Patient reported right neck pain of 6-7/10 in pain scale, reached out to , ordered set of pain medication, however patient refuses narcotic meds and requested for toradol instead. Dr. Santamaria Notified, ordered  Ibuprofen instead.   Upon giving ibuprofen, patient refuses and asked for oxycodone instead for increasing pain to 8-9/10 in pain scale. Oxycodone  5 mg tab given.

## 2025-03-08 NOTE — ED PROVIDER NOTES
"ED Provider Note    CHIEF COMPLAINT  Chief Complaint   Patient presents with    Sent by MD [facility] Jimenez transfer from last night for ENT follow up. Neck swelling for 2 weeks.        EXTERNAL RECORDS REVIEWED  External ED Note records from [facility] emergency department where patient received a CT scan and ultrasound showing right sided neck abscess versus neoplasm.  She was given Unasyn and transfer was arranged by private vehicle    HPI/ROS  LIMITATION TO HISTORY   Select: : None  OUTSIDE HISTORIAN(S):      Marilyn Feliciano is a 74 y.o. female who presents from outside hospital for suspected neck abscess.  Patient has had worsening swelling and pain over the past week to the right side of her neck.  No dysphagia, trouble breathing, fevers.    PAST MEDICAL HISTORY       SURGICAL HISTORY  patient denies any surgical history    FAMILY HISTORY  History reviewed. No pertinent family history.    SOCIAL HISTORY  Social History     Tobacco Use    Smoking status: Never    Smokeless tobacco: Never   Substance and Sexual Activity    Alcohol use: Not Currently    Drug use: Not Currently    Sexual activity: Not on file       CURRENT MEDICATIONS  Home Medications       Reviewed by Bharat Baugh (Pharmacy Tech) on 03/07/25 at 1940  Med List Status: Complete     Medication Last Dose Status   Butalbital-APAP-Caff-Cod (FIORICET/CODEINE) -70-30 MG Cap 3/7/2025 Active   diphenhydrAMINE-APAP, sleep, (TYLENOL PM EXTRA STRENGTH)  MG Tab 3/5/2025 Active   Ibuprofen (ADVIL LIQUI-GELS MINIS) 200 MG Cap 3/7/2025 Active   levothyroxine (SYNTHROID) 50 MCG Tab 3/7/2025 Active   propranolol (INDERAL) 40 MG Tab 3/7/2025 Active   topiramate (TOPAMAX) 50 MG tablet 3/7/2025 Active   zolmitriptan (ZOMIG) 5 MG tablet 3/5/2025 Active                    ALLERGIES  No Known Allergies    PHYSICAL EXAM  VITAL SIGNS: /63   Pulse 63   Temp 36.2 °C (97.2 °F) (Temporal)   Resp 17   Ht 1.626 m (5' 4\")   Wt 71.2 kg (156 lb " 15.5 oz)   SpO2 99%   BMI 26.94 kg/m²    Physical Exam  Vitals and nursing note reviewed.   Constitutional:       Appearance: She is well-developed.   HENT:      Head: Normocephalic.      Mouth/Throat:      Comments: No oropharyngeal swelling or mass lesion  Eyes:      Extraocular Movements: Extraocular movements intact.      Pupils: Pupils are equal, round, and reactive to light.   Neck:      Comments: Extending from zone 1 to zone 3 of the right lateral neck there is an area of erythema, tenderness and induration.  Cardiovascular:      Rate and Rhythm: Normal rate and regular rhythm.   Pulmonary:      Effort: Pulmonary effort is normal.      Breath sounds: Normal breath sounds. No wheezing, rhonchi or rales.   Abdominal:      Palpations: Abdomen is soft.      Tenderness: There is no abdominal tenderness.   Musculoskeletal:      Cervical back: Normal range of motion.   Neurological:      Mental Status: She is alert and oriented to person, place, and time.         EKG/LABS  Labs Reviewed   CBC WITH DIFFERENTIAL - Abnormal; Notable for the following components:       Result Value    WBC 11.8 (*)     Neutrophils-Polys 72.20 (*)     Lymphocytes 14.90 (*)     Neutrophils (Absolute) 8.56 (*)     Monos (Absolute) 0.93 (*)     All other components within normal limits   COMP METABOLIC PANEL - Abnormal; Notable for the following components:    Alkaline Phosphatase 117 (*)     Globulin 4.0 (*)     All other components within normal limits   PROTHROMBIN TIME - Abnormal; Notable for the following components:    INR 1.14 (*)     All other components within normal limits   LACTIC ACID   APTT   ESTIMATED GFR         COURSE & MEDICAL DECISION MAKING    ASSESSMENT, COURSE AND PLAN  Care Narrative: 74-year-old female transfer from outside hospital for right sided neck abscess versus neoplasm.  Patient given Unasyn prior to transfer she did have a moderate leukocytosis.  Patient otherwise well-appearing on exam with normal vital  signs tolerating secretions well no imminent airway compromise no signs of intraoral swelling.  Case discussed with ENT who recommends admission and IV antibiotics and they will evaluate.  Case was then discussed with the hospitalist who has kindly agreed to admit for further management              ADDITIONAL PROBLEMS MANAGED  History reviewed. No pertinent past medical history.    DISPOSITION AND DISCUSSIONS  I have discussed management of the patient with the following physicians and BROCK's: Dr. Antony hospitalist, Dr. Fernandes ENT    Discussion of management with other Q or appropriate source(s):      Escalation of care considered, and ultimately not performed:    Barriers to care at this time, including but not limited to: .     Decision tools and prescription drugs considered including, but not limited to: Antibiotics clindamycin.    FINAL DIAGNOSIS  1. Neck abscess    2. Neck swelling         Electronically signed by: Hernan Arceo M.D., 3/7/2025 9:30 PM

## 2025-03-08 NOTE — PROGRESS NOTES
4 Eyes Skin Assessment Completed by ANTON Mcleod and ANTON Peterson.    Head WDL  Ears WDL  Nose WDL  Mouth WDL  Neck Redness, swellin on the right neck   Breast/Chest WDL  Shoulder Blades WDL  Spine WDL  (R) Arm/Elbow/Hand WDL  (L) Arm/Elbow/Hand WDL  Abdomen WDL  Groin WDL  Scrotum/Coccyx/Buttocks Redness and Blanching  (R) Leg WDL, dry   (L) Leg WDL, dry   (R) Heel/Foot/Toe Scab, redness   (L) Heel/Foot/Toe Redness and Blanching, scab on the left 5th toe           Devices In Places Tele Box, Blood Pressure Cuff, and Pulse Ox      Interventions In Place Barrier Cream    Possible Skin Injury No    Pictures Uploaded Into Epic Yes  Wound Consult Placed N/A  RN Wound Prevention Protocol Ordered Yes

## 2025-03-08 NOTE — ED NOTES
Pt to SMT via pt transport. Pt has all belongings at time of transfer. No belongings left in room after transfer. Family at bedside.

## 2025-03-09 LAB
ALBUMIN SERPL BCP-MCNC: 3.5 G/DL (ref 3.2–4.9)
ALBUMIN/GLOB SERPL: 0.9 G/DL
ALP SERPL-CCNC: 112 U/L (ref 30–99)
ALT SERPL-CCNC: 12 U/L (ref 2–50)
ANION GAP SERPL CALC-SCNC: 10 MMOL/L (ref 7–16)
AST SERPL-CCNC: 23 U/L (ref 12–45)
BASOPHILS # BLD AUTO: 0.5 % (ref 0–1.8)
BASOPHILS # BLD: 0.07 K/UL (ref 0–0.12)
BILIRUB SERPL-MCNC: 0.3 MG/DL (ref 0.1–1.5)
BUN SERPL-MCNC: 18 MG/DL (ref 8–22)
CALCIUM ALBUM COR SERPL-MCNC: 9.4 MG/DL (ref 8.5–10.5)
CALCIUM SERPL-MCNC: 9 MG/DL (ref 8.5–10.5)
CHLORIDE SERPL-SCNC: 106 MMOL/L (ref 96–112)
CO2 SERPL-SCNC: 24 MMOL/L (ref 20–33)
CREAT SERPL-MCNC: 0.85 MG/DL (ref 0.5–1.4)
EOSINOPHIL # BLD AUTO: 0.46 K/UL (ref 0–0.51)
EOSINOPHIL NFR BLD: 3.6 % (ref 0–6.9)
ERYTHROCYTE [DISTWIDTH] IN BLOOD BY AUTOMATED COUNT: 45.1 FL (ref 35.9–50)
GFR SERPLBLD CREATININE-BSD FMLA CKD-EPI: 71 ML/MIN/1.73 M 2
GLOBULIN SER CALC-MCNC: 3.8 G/DL (ref 1.9–3.5)
GLUCOSE SERPL-MCNC: 122 MG/DL (ref 65–99)
HCT VFR BLD AUTO: 40.7 % (ref 37–47)
HGB BLD-MCNC: 13.3 G/DL (ref 12–16)
IMM GRANULOCYTES # BLD AUTO: 0.08 K/UL (ref 0–0.11)
IMM GRANULOCYTES NFR BLD AUTO: 0.6 % (ref 0–0.9)
LYMPHOCYTES # BLD AUTO: 1.24 K/UL (ref 1–4.8)
LYMPHOCYTES NFR BLD: 9.7 % (ref 22–41)
MCH RBC QN AUTO: 30.8 PG (ref 27–33)
MCHC RBC AUTO-ENTMCNC: 32.7 G/DL (ref 32.2–35.5)
MCV RBC AUTO: 94.2 FL (ref 81.4–97.8)
MONOCYTES # BLD AUTO: 1.08 K/UL (ref 0–0.85)
MONOCYTES NFR BLD AUTO: 8.5 % (ref 0–13.4)
NEUTROPHILS # BLD AUTO: 9.82 K/UL (ref 1.82–7.42)
NEUTROPHILS NFR BLD: 77.1 % (ref 44–72)
NRBC # BLD AUTO: 0 K/UL
NRBC BLD-RTO: 0 /100 WBC (ref 0–0.2)
PLATELET # BLD AUTO: 234 K/UL (ref 164–446)
PMV BLD AUTO: 9.1 FL (ref 9–12.9)
POTASSIUM SERPL-SCNC: 3.8 MMOL/L (ref 3.6–5.5)
PROT SERPL-MCNC: 7.3 G/DL (ref 6–8.2)
RBC # BLD AUTO: 4.32 M/UL (ref 4.2–5.4)
SODIUM SERPL-SCNC: 140 MMOL/L (ref 135–145)
WBC # BLD AUTO: 12.8 K/UL (ref 4.8–10.8)

## 2025-03-09 PROCEDURE — A9270 NON-COVERED ITEM OR SERVICE: HCPCS

## 2025-03-09 PROCEDURE — 80053 COMPREHEN METABOLIC PANEL: CPT

## 2025-03-09 PROCEDURE — 85025 COMPLETE CBC W/AUTO DIFF WBC: CPT

## 2025-03-09 PROCEDURE — 770001 HCHG ROOM/CARE - MED/SURG/GYN PRIV*

## 2025-03-09 PROCEDURE — 700102 HCHG RX REV CODE 250 W/ 637 OVERRIDE(OP)

## 2025-03-09 PROCEDURE — 36415 COLL VENOUS BLD VENIPUNCTURE: CPT

## 2025-03-09 PROCEDURE — 700111 HCHG RX REV CODE 636 W/ 250 OVERRIDE (IP)

## 2025-03-09 PROCEDURE — 99232 SBSQ HOSP IP/OBS MODERATE 35: CPT | Performed by: INTERNAL MEDICINE

## 2025-03-09 RX ORDER — IBUPROFEN 600 MG/1
600 TABLET, FILM COATED ORAL
Status: DISCONTINUED | OUTPATIENT
Start: 2025-03-09 | End: 2025-03-10 | Stop reason: HOSPADM

## 2025-03-09 RX ORDER — IBUPROFEN 600 MG/1
600 TABLET, FILM COATED ORAL 3 TIMES DAILY PRN
Status: DISCONTINUED | OUTPATIENT
Start: 2025-03-13 | End: 2025-03-10 | Stop reason: HOSPADM

## 2025-03-09 RX ADMIN — TOPIRAMATE 50 MG: 25 TABLET, FILM COATED ORAL at 06:09

## 2025-03-09 RX ADMIN — ACETAMINOPHEN 1000 MG: 500 TABLET ORAL at 23:13

## 2025-03-09 RX ADMIN — ACETAMINOPHEN 1000 MG: 500 TABLET ORAL at 18:04

## 2025-03-09 RX ADMIN — PROPRANOLOL HYDROCHLORIDE 40 MG: 10 TABLET ORAL at 06:10

## 2025-03-09 RX ADMIN — IBUPROFEN 800 MG: 800 TABLET, FILM COATED ORAL at 10:44

## 2025-03-09 RX ADMIN — LEVOTHYROXINE SODIUM 50 MCG: 0.05 TABLET ORAL at 06:09

## 2025-03-09 RX ADMIN — TOPIRAMATE 50 MG: 25 TABLET, FILM COATED ORAL at 18:07

## 2025-03-09 RX ADMIN — CLINDAMYCIN IN 5 PERCENT DEXTROSE 600 MG: 12 INJECTION, SOLUTION INTRAVENOUS at 06:09

## 2025-03-09 RX ADMIN — CLINDAMYCIN IN 5 PERCENT DEXTROSE 600 MG: 12 INJECTION, SOLUTION INTRAVENOUS at 14:45

## 2025-03-09 RX ADMIN — IBUPROFEN 600 MG: 600 TABLET, FILM COATED ORAL at 18:03

## 2025-03-09 RX ADMIN — ACETAMINOPHEN 1000 MG: 500 TABLET ORAL at 06:09

## 2025-03-09 RX ADMIN — PROPRANOLOL HYDROCHLORIDE 80 MG: 40 TABLET ORAL at 18:05

## 2025-03-09 RX ADMIN — CLINDAMYCIN IN 5 PERCENT DEXTROSE 600 MG: 12 INJECTION, SOLUTION INTRAVENOUS at 21:36

## 2025-03-09 RX ADMIN — ACETAMINOPHEN 1000 MG: 500 TABLET ORAL at 12:19

## 2025-03-09 ASSESSMENT — PAIN DESCRIPTION - PAIN TYPE
TYPE: ACUTE PAIN

## 2025-03-09 ASSESSMENT — ENCOUNTER SYMPTOMS
CHILLS: 0
MUSCULOSKELETAL NEGATIVE: 1
EYES NEGATIVE: 1
FEVER: 0
NEUROLOGICAL NEGATIVE: 1
SORE THROAT: 1
RESPIRATORY NEGATIVE: 1
SINUS PAIN: 0
PSYCHIATRIC NEGATIVE: 1
CONSTITUTIONAL NEGATIVE: 1
CARDIOVASCULAR NEGATIVE: 1
WEIGHT LOSS: 0
GASTROINTESTINAL NEGATIVE: 1

## 2025-03-09 NOTE — HOSPITAL COURSE
Pt is a 74/F with a PMH of hypothyroidism on synthroid who presents for a right-sided neck mass. She was evaluated at [facility] ED a few days ago for a mass that progressively grew on the right side of her neck for approximately 2 weeks. US and CT neck done there showed a possible neck abscess versus neoplasm. She was started on Unasyn then referred to [facility] for further workup from an ENT provider. While in our ED she was started on Clindamycin due to a Unasyn shortage and an ENT provider was consulted by the ED physician.  ENT recommends obtaining FNA first to rule out neoplasm and if it is not, they will consider I&D.

## 2025-03-09 NOTE — CONSULTS
Date of Service:   3/8/2025    PCP: No primary care provider on file.    CC:   right neck mass    HPI: This is a 74 y.o. female with  illness since November and progressive feeling of swelling in the neck.  In the past 2 weeks however this rapidly increased.  She has some pain and swelling in the right neck.  She was seen at an outside facility where CT scan was done which was available for my review.  My review did show that there was a significant soft tissue component to the neck mass but there is also some lucency indicating possible neoplasm with necrosis.  The patient is not a smoker.  She does not have a history of malignant neoplasm.    ROS: As above. The remainder of a complete review of systems is negative in all systems except as noted.    PMHx:  Active Ambulatory Problems     Diagnosis Date Noted    No Active Ambulatory Problems     Resolved Ambulatory Problems     Diagnosis Date Noted    No Resolved Ambulatory Problems     No Additional Past Medical History       SHx:  Social History     Socioeconomic History    Marital status: Not on file     Spouse name: Not on file    Number of children: Not on file    Years of education: Not on file    Highest education level: Not on file   Occupational History    Not on file   Tobacco Use    Smoking status: Never    Smokeless tobacco: Never   Substance and Sexual Activity    Alcohol use: Not Currently    Drug use: Not Currently    Sexual activity: Not on file   Other Topics Concern    Not on file   Social History Narrative    Not on file     Social Drivers of Health     Financial Resource Strain: Not on file   Food Insecurity: No Food Insecurity (3/8/2025)    Hunger Vital Sign     Worried About Running Out of Food in the Last Year: Never true     Ran Out of Food in the Last Year: Never true   Transportation Needs: No Transportation Needs (3/8/2025)    PRAPARE - Transportation     Lack of Transportation (Medical): No     Lack of Transportation (Non-Medical): No    Recent Concern: Transportation Needs - Unmet Transportation Needs (3/7/2025)    PRAPARE - Transportation     Lack of Transportation (Medical): Yes     Lack of Transportation (Non-Medical): Yes   Physical Activity: Not on file   Stress: Not on file   Social Connections: Not on file   Intimate Partner Violence: Not At Risk (3/7/2025)    Humiliation, Afraid, Rape, and Kick questionnaire     Fear of Current or Ex-Partner: No     Emotionally Abused: No     Physically Abused: No     Sexually Abused: No   Housing Stability: Low Risk  (3/8/2025)    Housing Stability Vital Sign     Unable to Pay for Housing in the Last Year: No     Number of Times Moved in the Last Year: 0     Homeless in the Last Year: No       FHx:  family history is not on file.    Allergies:  No Known Allergies    Medications:  No current facility-administered medications on file prior to encounter.     Current Outpatient Medications on File Prior to Encounter   Medication Sig Dispense Refill    propranolol (INDERAL) 40 MG Tab Take 40-80 mg by mouth 2 times a day. Take 1 tablet (40 mg) in the morning and 2 tablets (80 mg) in the evening.      topiramate (TOPAMAX) 50 MG tablet Take 50 mg by mouth 2 times a day.      levothyroxine (SYNTHROID) 50 MCG Tab Take 50 mcg by mouth every morning on an empty stomach.      Butalbital-APAP-Caff-Cod (FIORICET/CODEINE) -31-30 MG Cap Take 1 Capsule by mouth every 6 hours as needed (Migraine).      zolmitriptan (ZOMIG) 5 MG tablet Take 5 mg by mouth 1 time a day as needed for Migraine.      diphenhydrAMINE-APAP, sleep, (TYLENOL PM EXTRA STRENGTH)  MG Tab Take 2 Tablets by mouth at bedtime as needed (Mild Pain, Sleep).      Ibuprofen (ADVIL LIQUI-GELS MINIS) 200 MG Cap Take 400-600 mg by mouth every 6 hours as needed (Mild to Moderate Pain). 2 to 3 capsules = 400 to 600 mg.         Objective Exam:  Vitals:    03/08/25 0549 03/08/25 1615 03/08/25 1748 03/08/25 1853   BP: 124/55 (!) 156/74 127/74 132/61    Pulse: 64 60 69 (!) 54   Resp:  18  16   Temp:  36.1 °C (97 °F)  36.1 °C (97 °F)   TempSrc:  Temporal  Temporal   SpO2:  97%  97%   Weight:       Height:           General:   Well-developed well-nourished woman resting comfortably in bed  HEENT:  large right neck mass.  Firm.  The overlying skin is erythematous  Chest: .  Good excursion with respiration  CV:  regular rate and rhythm  Abd:  no abdominal distention noted  Ext:  no clubbing cyanosis or edema  Neuro:  patient alert and oriented and able to carry on a complex conversation without difficulty.  Skin:   Not diaphoretic, dry    Laboratory--reviewed personally and are as follows:  Lab Results   Component Value Date/Time    WBC 11.8 (H) 03/08/2025 02:48 AM    RBC 4.28 03/08/2025 02:48 AM    HEMOGLOBIN 12.9 03/08/2025 02:48 AM    HEMATOCRIT 38.6 03/08/2025 02:48 AM    MCV 90.2 03/08/2025 02:48 AM    MCH 30.1 03/08/2025 02:48 AM    MCHC 33.4 03/08/2025 02:48 AM    MPV 9.2 03/08/2025 02:48 AM    NEUTSPOLYS 78.00 (H) 03/08/2025 02:48 AM    LYMPHOCYTES 10.40 (L) 03/08/2025 02:48 AM    MONOCYTES 7.30 03/08/2025 02:48 AM    EOSINOPHILS 3.10 03/08/2025 02:48 AM    BASOPHILS 0.60 03/08/2025 02:48 AM      Lab Results   Component Value Date/Time    SODIUM 139 03/08/2025 02:48 AM    POTASSIUM 3.8 03/08/2025 02:48 AM    CHLORIDE 106 03/08/2025 02:48 AM    CO2 22 03/08/2025 02:48 AM    GLUCOSE 113 (H) 03/08/2025 02:48 AM    BUN 17 03/08/2025 02:48 AM    CREATININE 0.66 03/08/2025 02:48 AM      Lab Results   Component Value Date/Time    PROTHROMBTM 14.6 03/07/2025 06:53 PM    INR 1.14 (H) 03/07/2025 06:53 PM        Radiology   outside CT reviewed, please see above      Assessment/Plan:     patient with probable neoplasm of the neck.  I did note some thyroid abnormalities on her CT scan.  This may be a necrotic mass that has been secondarily superinfected or it may simply be a chronic abscess.  Await FNA to rule out neoplasm if it is not a neoplasm then we will incise and  drain the abscess.  If it is a neoplasm, then therapy will be guided by which type of neoplasm.  Principal Problem:    Neck mass (POA: Yes)  Active Problems:    Hypothyroidism (POA: Unknown)    Migraines (POA: Unknown)  Resolved Problems:    * No resolved hospital problems. *

## 2025-03-09 NOTE — CARE PLAN
The patient is Stable - Low risk of patient condition declining or worsening    Shift Goals  Clinical Goals: ENT referral, pain management  Patient Goals: plan of care, advance diet  Family Goals: updates    Progress made toward(s) clinical / shift goals:        Problem: Pain - Standard  Goal: Alleviation of pain or a reduction in pain to the patient’s comfort goal  Outcome: Progressing  Note: Adult pain scale utilized to assess pain. Education provided on pharmacologic, and non-pharmacologic, pain management methods, with pt verbalizing understanding. Pt's response to pain medication provided monitored for effectiveness. Scheduled Motrin given to assist w/pain, good effect noted.     Problem: Knowledge Deficit - Standard  Goal: Patient and family/care givers will demonstrate understanding of plan of care, disease process/condition, diagnostic tests and medications  Outcome: Progressing  Note: Education provided on all medications, treatments, and plan of care, with pt verbalizing understanding.

## 2025-03-09 NOTE — PROGRESS NOTES
[facility] Internal Medicine Daily Progress Note    Date of Service  3/9/2025    [facility] Team: [facility]   Attending: Titus Nieves M.d.  Senior Resident: Dr. Arsen Carrillo  Intern:  Dr. Marina Evangelista  Contact Number: [phone]    Chief Complaint  Marilyn Feliciano is a 74 y.o. female admitted 3/7/2025 with suspected neck abscess    Hospital Course  Pt is a 74/F with a PMH of hypothyroidism on synthroid who presents for a right-sided neck mass. She was evaluated at [facility] ED a few days ago for a mass that progressively grew on the right side of her neck for approximately 2 weeks. US and CT neck done there showed a possible neck abscess versus neoplasm. She was started on Unasyn then referred to [facility] for further workup from an ENT provider. While in our ED she was started on Clindamycin due to a Unasyn shortage and an ENT provider was consulted by the ED physician.  ENT recommends obtaining FNA first to rule out neoplasm and if it is not, they will consider I&D.    Interval Problem Update  No events overnight. Pt states her pain is improved from before.  No fevers or chills overnight.  -WBC slightly increasing up to 12.8 from 11.8  - Continue clindamycin  -Pending IR FNA bx tmrw (3/10), n.p.o. midnight  -ENT following    I have discussed this patient's plan of care and discharge plan at IDT rounds today with Case Management, Nursing, Nursing leadership, and other members of the IDT team.    Consultants/Specialty  N/A    Code Status  Full Code    Disposition  The patient is not medically cleared for discharge to home or a post-acute facility.      I have placed the appropriate orders for post-discharge needs.    Review of Systems  Review of Systems   Constitutional: Negative.  Negative for chills, fever, malaise/fatigue and weight loss.   HENT:  Positive for sore throat. Negative for sinus pain.    Eyes: Negative.    Respiratory: Negative.     Cardiovascular: Negative.    Gastrointestinal: Negative.     Genitourinary: Negative.    Musculoskeletal: Negative.    Skin: Negative.    Neurological: Negative.    Endo/Heme/Allergies: Negative.    Psychiatric/Behavioral: Negative.          Physical Exam  Temp:  [36 °C (96.8 °F)-36.1 °C (97 °F)] 36.1 °C (97 °F)  Pulse:  [54-69] 54  Resp:  [16-18] 16  BP: ()/(59-74) 107/59  SpO2:  [97 %] 97 %    Physical Exam  HENT:      Head: Normocephalic.      Right Ear: External ear normal.      Left Ear: External ear normal.      Mouth/Throat:      Mouth: Mucous membranes are moist.      Pharynx: No oropharyngeal exudate or posterior oropharyngeal erythema.   Neck:      Comments: Tender area from anterolateral aspect of right side of the neck, extending to behind the ear that spans from her submandibular area down to right clavicle. (+) induration. No sinus tracts inspected  Cardiovascular:      Rate and Rhythm: Normal rate and regular rhythm.   Pulmonary:      Breath sounds: Normal breath sounds.   Abdominal:      General: Abdomen is flat.      Palpations: Abdomen is soft.   Musculoskeletal:         General: Normal range of motion.      Cervical back: Normal range of motion. Tenderness present.   Lymphadenopathy:      Cervical: No cervical adenopathy.   Skin:     Findings: Erythema present.   Neurological:      Mental Status: She is alert and oriented to person, place, and time.   Psychiatric:         Mood and Affect: Mood normal.         Fluids    Intake/Output Summary (Last 24 hours) at 3/9/2025 1239  Last data filed at 3/9/2025 0800  Gross per 24 hour   Intake 292.65 ml   Output 0 ml   Net 292.65 ml       Laboratory  Recent Labs     03/07/25 1853 03/08/25  0248 03/09/25  0707   WBC 11.8* 11.8* 12.8*   RBC 4.58 4.28 4.32   HEMOGLOBIN 13.7 12.9 13.3   HEMATOCRIT 42.5 38.6 40.7   MCV 92.8 90.2 94.2   MCH 29.9 30.1 30.8   MCHC 32.2 33.4 32.7   RDW 44.7 43.4 45.1   PLATELETCT 239 193 234   MPV 9.0 9.2 9.1     Recent Labs     03/07/25  1853 03/08/25  0248 03/09/25  0707   SODIUM  140 139 140   POTASSIUM 3.8 3.8 3.8   CHLORIDE 105 106 106   CO2 25 22 24   GLUCOSE 88 113* 122*   BUN 19 17 18   CREATININE 0.71 0.66 0.85   CALCIUM 9.4 9.0 9.0     Recent Labs     03/07/25  1853   APTT 27.6   INR 1.14*               Imaging  [id number] FILM ULTRASOUND   Final Result      CT-OUTSIDE IMAGES-CT NECK   Final Result      IR-FNA BIOPSY W/ FLUORO GUIDANCE    (Results Pending)        Assessment/Plan  Problem Representation:    * Neck mass- (present on admission)  Assessment & Plan  Outside CT scan and Ultrasound of the neck describing a circumscribed mass lesion with central fluid which could be a abscess versus lymph node versus cancer  ENT (Dr. Fernandes) consulted  - Continue clindamycin for now  -Plan for FNA biopsy on 3/10.  If neoplasm ruled out, ENT will consider I&D  -Pain control    Migraines  Assessment & Plan  Patient not presenting with any migraines  - Continue home pain medications    Hypothyroidism  Assessment & Plan  - Continue home Levothyroxine 50mcg         VTE prophylaxis: SCDs/TEDs    I have performed a physical exam and reviewed and updated ROS and Plan today (3/9/2025). In review of yesterday's note (3/8/2025), there are no changes except as documented above.

## 2025-03-10 ENCOUNTER — APPOINTMENT (OUTPATIENT)
Dept: RADIOLOGY | Facility: MEDICAL CENTER | Age: 75
DRG: 603 | End: 2025-03-10
Payer: MEDICARE

## 2025-03-10 ENCOUNTER — PHARMACY VISIT (OUTPATIENT)
Dept: PHARMACY | Facility: MEDICAL CENTER | Age: 75
End: 2025-03-10
Payer: COMMERCIAL

## 2025-03-10 VITALS
HEART RATE: 76 BPM | OXYGEN SATURATION: 95 % | DIASTOLIC BLOOD PRESSURE: 65 MMHG | RESPIRATION RATE: 18 BRPM | WEIGHT: 156.75 LBS | SYSTOLIC BLOOD PRESSURE: 128 MMHG | BODY MASS INDEX: 26.76 KG/M2 | HEIGHT: 64 IN | TEMPERATURE: 97 F

## 2025-03-10 PROBLEM — E87.6 HYPOKALEMIA: Status: ACTIVE | Noted: 2025-03-10

## 2025-03-10 PROBLEM — D72.829 LEUKOCYTOSIS: Status: ACTIVE | Noted: 2025-03-10

## 2025-03-10 LAB
ALBUMIN SERPL BCP-MCNC: 3.7 G/DL (ref 3.2–4.9)
ALBUMIN/GLOB SERPL: 1 G/DL
ALP SERPL-CCNC: 116 U/L (ref 30–99)
ALT SERPL-CCNC: 11 U/L (ref 2–50)
ANION GAP SERPL CALC-SCNC: 12 MMOL/L (ref 7–16)
AST SERPL-CCNC: 20 U/L (ref 12–45)
BILIRUB SERPL-MCNC: <0.2 MG/DL (ref 0.1–1.5)
BUN SERPL-MCNC: 18 MG/DL (ref 8–22)
CALCIUM ALBUM COR SERPL-MCNC: 9 MG/DL (ref 8.5–10.5)
CALCIUM SERPL-MCNC: 8.8 MG/DL (ref 8.5–10.5)
CHLORIDE SERPL-SCNC: 107 MMOL/L (ref 96–112)
CO2 SERPL-SCNC: 21 MMOL/L (ref 20–33)
CREAT SERPL-MCNC: 0.69 MG/DL (ref 0.5–1.4)
ERYTHROCYTE [DISTWIDTH] IN BLOOD BY AUTOMATED COUNT: 45 FL (ref 35.9–50)
GFR SERPLBLD CREATININE-BSD FMLA CKD-EPI: 91 ML/MIN/1.73 M 2
GLOBULIN SER CALC-MCNC: 3.7 G/DL (ref 1.9–3.5)
GLUCOSE SERPL-MCNC: 93 MG/DL (ref 65–99)
GRAM STN SPEC: NORMAL
HCT VFR BLD AUTO: 41.4 % (ref 37–47)
HGB BLD-MCNC: 13.5 G/DL (ref 12–16)
MCH RBC QN AUTO: 30.6 PG (ref 27–33)
MCHC RBC AUTO-ENTMCNC: 32.6 G/DL (ref 32.2–35.5)
MCV RBC AUTO: 93.9 FL (ref 81.4–97.8)
PATHOLOGY CONSULT NOTE: NORMAL
PLATELET # BLD AUTO: 254 K/UL (ref 164–446)
PMV BLD AUTO: 9.3 FL (ref 9–12.9)
POTASSIUM SERPL-SCNC: 3.5 MMOL/L (ref 3.6–5.5)
PROT SERPL-MCNC: 7.4 G/DL (ref 6–8.2)
RBC # BLD AUTO: 4.41 M/UL (ref 4.2–5.4)
SIGNIFICANT IND 70042: NORMAL
SITE SITE: NORMAL
SODIUM SERPL-SCNC: 140 MMOL/L (ref 135–145)
SOURCE SOURCE: NORMAL
WBC # BLD AUTO: 14.3 K/UL (ref 4.8–10.8)

## 2025-03-10 PROCEDURE — 700102 HCHG RX REV CODE 250 W/ 637 OVERRIDE(OP)

## 2025-03-10 PROCEDURE — 88304 TISSUE EXAM BY PATHOLOGIST: CPT | Performed by: PATHOLOGY

## 2025-03-10 PROCEDURE — RXMED WILLOW AMBULATORY MEDICATION CHARGE

## 2025-03-10 PROCEDURE — 10030 IMG GID FLU COLL DRG SFT TIS: CPT

## 2025-03-10 PROCEDURE — 0W963ZX DRAINAGE OF NECK, PERCUTANEOUS APPROACH, DIAGNOSTIC: ICD-10-PCS | Performed by: STUDENT IN AN ORGANIZED HEALTH CARE EDUCATION/TRAINING PROGRAM

## 2025-03-10 PROCEDURE — 700111 HCHG RX REV CODE 636 W/ 250 OVERRIDE (IP)

## 2025-03-10 PROCEDURE — 80053 COMPREHEN METABOLIC PANEL: CPT

## 2025-03-10 PROCEDURE — 700102 HCHG RX REV CODE 250 W/ 637 OVERRIDE(OP): Mod: JZ

## 2025-03-10 PROCEDURE — 85027 COMPLETE CBC AUTOMATED: CPT

## 2025-03-10 PROCEDURE — 87077 CULTURE AEROBIC IDENTIFY: CPT

## 2025-03-10 PROCEDURE — A9270 NON-COVERED ITEM OR SERVICE: HCPCS

## 2025-03-10 PROCEDURE — 99232 SBSQ HOSP IP/OBS MODERATE 35: CPT | Performed by: INTERNAL MEDICINE

## 2025-03-10 PROCEDURE — 76942 ECHO GUIDE FOR BIOPSY: CPT

## 2025-03-10 PROCEDURE — 87070 CULTURE OTHR SPECIMN AEROBIC: CPT

## 2025-03-10 PROCEDURE — 88304 TISSUE EXAM BY PATHOLOGIST: CPT | Mod: 26 | Performed by: PATHOLOGY

## 2025-03-10 PROCEDURE — 87205 SMEAR GRAM STAIN: CPT

## 2025-03-10 PROCEDURE — A9270 NON-COVERED ITEM OR SERVICE: HCPCS | Mod: JZ

## 2025-03-10 PROCEDURE — 10005 FNA BX W/US GDN 1ST LES: CPT

## 2025-03-10 RX ORDER — OXYCODONE HYDROCHLORIDE 5 MG/1
5 TABLET ORAL EVERY 6 HOURS PRN
Qty: 12 TABLET | Refills: 0 | Status: SHIPPED | OUTPATIENT
Start: 2025-03-10 | End: 2025-03-13

## 2025-03-10 RX ORDER — POTASSIUM CHLORIDE 1500 MG/1
20 TABLET, EXTENDED RELEASE ORAL ONCE
Status: COMPLETED | OUTPATIENT
Start: 2025-03-10 | End: 2025-03-10

## 2025-03-10 RX ORDER — CLINDAMYCIN HYDROCHLORIDE 300 MG/1
300 CAPSULE ORAL 3 TIMES DAILY
Qty: 36 CAPSULE | Refills: 0 | Status: ACTIVE | OUTPATIENT
Start: 2025-03-10 | End: 2025-03-22

## 2025-03-10 RX ORDER — OXYCODONE HYDROCHLORIDE 5 MG/1
5 TABLET ORAL EVERY 6 HOURS PRN
Qty: 12 TABLET | Refills: 0 | Status: SHIPPED | OUTPATIENT
Start: 2025-03-10 | End: 2025-03-10

## 2025-03-10 RX ADMIN — TOPIRAMATE 50 MG: 25 TABLET, FILM COATED ORAL at 17:42

## 2025-03-10 RX ADMIN — ACETAMINOPHEN 1000 MG: 500 TABLET ORAL at 17:41

## 2025-03-10 RX ADMIN — POTASSIUM CHLORIDE 20 MEQ: 1500 TABLET, EXTENDED RELEASE ORAL at 07:47

## 2025-03-10 RX ADMIN — ACETAMINOPHEN 1000 MG: 500 TABLET ORAL at 07:47

## 2025-03-10 RX ADMIN — PROPRANOLOL HYDROCHLORIDE 80 MG: 40 TABLET ORAL at 17:40

## 2025-03-10 RX ADMIN — OXYCODONE 5 MG: 5 TABLET ORAL at 00:21

## 2025-03-10 RX ADMIN — OXYCODONE 5 MG: 5 TABLET ORAL at 03:33

## 2025-03-10 RX ADMIN — CLINDAMYCIN IN 5 PERCENT DEXTROSE 600 MG: 12 INJECTION, SOLUTION INTRAVENOUS at 05:07

## 2025-03-10 RX ADMIN — IBUPROFEN 600 MG: 600 TABLET, FILM COATED ORAL at 17:40

## 2025-03-10 RX ADMIN — TOPIRAMATE 50 MG: 25 TABLET, FILM COATED ORAL at 04:43

## 2025-03-10 RX ADMIN — IBUPROFEN 600 MG: 600 TABLET, FILM COATED ORAL at 13:32

## 2025-03-10 RX ADMIN — IBUPROFEN 600 MG: 600 TABLET, FILM COATED ORAL at 07:46

## 2025-03-10 RX ADMIN — CLINDAMYCIN IN 5 PERCENT DEXTROSE 600 MG: 12 INJECTION, SOLUTION INTRAVENOUS at 13:35

## 2025-03-10 RX ADMIN — LEVOTHYROXINE SODIUM 50 MCG: 0.05 TABLET ORAL at 04:43

## 2025-03-10 RX ADMIN — ACETAMINOPHEN 1000 MG: 500 TABLET ORAL at 13:33

## 2025-03-10 RX ADMIN — OXYCODONE 2.5 MG: 5 TABLET ORAL at 16:35

## 2025-03-10 ASSESSMENT — ENCOUNTER SYMPTOMS
GASTROINTESTINAL NEGATIVE: 1
PALPITATIONS: 0
DIZZINESS: 0
WEIGHT LOSS: 0
NAUSEA: 0
COUGH: 0
BACK PAIN: 0
PHOTOPHOBIA: 0
NECK PAIN: 1
EYES NEGATIVE: 1
SINUS PAIN: 0
FEVER: 0
HEADACHES: 0
VOMITING: 0
DEPRESSION: 0
BRUISES/BLEEDS EASILY: 0
HEARTBURN: 0
CARDIOVASCULAR NEGATIVE: 1
TINGLING: 0
RESPIRATORY NEGATIVE: 1
SPUTUM PRODUCTION: 0
SORE THROAT: 1
CHILLS: 0
BLURRED VISION: 0
ORTHOPNEA: 0
DOUBLE VISION: 0
CONSTITUTIONAL NEGATIVE: 1
NEUROLOGICAL NEGATIVE: 1
PSYCHIATRIC NEGATIVE: 1
HEMOPTYSIS: 0
MYALGIAS: 0

## 2025-03-10 ASSESSMENT — PAIN DESCRIPTION - PAIN TYPE
TYPE: ACUTE PAIN
TYPE: ACUTE PAIN
TYPE: CHRONIC PAIN
TYPE: ACUTE PAIN

## 2025-03-10 ASSESSMENT — LIFESTYLE VARIABLES: SUBSTANCE_ABUSE: 0

## 2025-03-10 NOTE — PROGRESS NOTES
[facility] Internal Medicine Daily Progress Note    Date of Service  3/10/2025    [facility] Team: [facility]   Attending: Titus Nieves M.d.  Senior Resident: Dr. Arsen Carrillo  Intern:  Dr. Marina Evangelista  Contact Number: [phone]    Chief Complaint  Marilyn Feliciano is a 74 y.o. female admitted 3/7/2025 with suspected neck abscess    Hospital Course  Pt is a 74/F with a PMH of hypothyroidism on synthroid who presents for a right-sided neck mass. She was evaluated at [facility] ED a few days ago for a mass that progressively grew on the right side of her neck for approximately 2 weeks. US and CT neck done there showed a possible neck abscess versus neoplasm. She was started on Unasyn then referred to [facility] for further workup from an ENT provider. While in our ED she was started on Clindamycin due to a Unasyn shortage and an ENT provider was consulted by the ED physician. ENT recommends obtaining FNA first to rule out neoplasm and if it is not, they will consider I&D.     Interval Problem Update  Overnight events: None    Patient stated that she was frustrated as she is not able to leave the hospital and conduct business/personal matters due to need for biopsy.  I explained to the patient how is necessary for this fine-needle aspiration to be conducted to give us an idea of the etiology of her neck mass.  -WBC count 12.8 -> 14.3.  -Potassium 3.5, repleted, ALP persistently elevated.    I have discussed this patient's plan of care and discharge plan at IDT rounds today with Case Management, Nursing, Nursing leadership, and other members of the IDT team.    Consultants/Specialty  N/A    Code Status  Full Code    Disposition  The patient is not medically cleared for discharge to home or a post-acute facility.      I have placed the appropriate orders for post-discharge needs.    Review of Systems  Review of Systems   Constitutional: Negative.  Negative for chills, fever, malaise/fatigue and weight loss.    HENT:  Positive for sore throat. Negative for hearing loss, sinus pain and tinnitus.    Eyes: Negative.  Negative for blurred vision, double vision and photophobia.   Respiratory: Negative.  Negative for cough, hemoptysis and sputum production.    Cardiovascular: Negative.  Negative for chest pain, palpitations and orthopnea.   Gastrointestinal: Negative.  Negative for heartburn, nausea and vomiting.   Genitourinary: Negative.  Negative for dysuria, frequency and urgency.   Musculoskeletal:  Positive for neck pain. Negative for back pain, joint pain and myalgias.   Skin: Negative.    Neurological: Negative.  Negative for dizziness, tingling and headaches.   Endo/Heme/Allergies: Negative.  Does not bruise/bleed easily.   Psychiatric/Behavioral: Negative.  Negative for depression, substance abuse and suicidal ideas.       Physical Exam  Temp:  [36 °C (96.8 °F)-36.4 °C (97.5 °F)] 36.2 °C (97.2 °F)  Pulse:  [56-66] 66  Resp:  [16] 16  BP: (112-156)/(59-74) 129/67  SpO2:  [93 %-99 %] 95 %    Physical Exam  HENT:      Head: Normocephalic.      Right Ear: External ear normal.      Left Ear: External ear normal.      Mouth/Throat:      Mouth: Mucous membranes are moist.      Pharynx: No oropharyngeal exudate or posterior oropharyngeal erythema.   Neck:      Comments: Tender area from anterolateral aspect of right side of the neck, extending to behind the ear that spans from her submandibular area down to right clavicle. (+) induration. No sinus tracts inspected  Cardiovascular:      Rate and Rhythm: Normal rate and regular rhythm.   Pulmonary:      Breath sounds: Normal breath sounds.   Abdominal:      General: Abdomen is flat.      Palpations: Abdomen is soft.   Musculoskeletal:         General: Normal range of motion.      Cervical back: Normal range of motion. Tenderness present.   Lymphadenopathy:      Cervical: No cervical adenopathy.   Skin:     Findings: Erythema present.   Neurological:      Mental Status: She is  alert and oriented to person, place, and time.   Psychiatric:         Mood and Affect: Mood normal.       Fluids    Intake/Output Summary (Last 24 hours) at 3/10/2025 1255  Last data filed at 3/10/2025 1200  Gross per 24 hour   Intake 338.77 ml   Output 0 ml   Net 338.77 ml     Laboratory  Recent Labs     03/08/25  0248 03/09/25  0707 03/10/25  0026   WBC 11.8* 12.8* 14.3*   RBC 4.28 4.32 4.41   HEMOGLOBIN 12.9 13.3 13.5   HEMATOCRIT 38.6 40.7 41.4   MCV 90.2 94.2 93.9   MCH 30.1 30.8 30.6   MCHC 33.4 32.7 32.6   RDW 43.4 45.1 45.0   PLATELETCT 193 234 254   MPV 9.2 9.1 9.3     Recent Labs     03/08/25  0248 03/09/25  0707 03/10/25  0026   SODIUM 139 140 140   POTASSIUM 3.8 3.8 3.5*   CHLORIDE 106 106 107   CO2 22 24 21   GLUCOSE 113* 122* 93   BUN 17 18 18   CREATININE 0.66 0.85 0.69   CALCIUM 9.0 9.0 8.8     Recent Labs     03/07/25  1853   APTT 27.6   INR 1.14*               Imaging  [id number] FILM ULTRASOUND   Final Result      CT-OUTSIDE IMAGES-CT NECK   Final Result      IR-FNA BIOPSY W/ FLUORO GUIDANCE    (Results Pending)      Assessment/Plan  Problem Representation:    * Neck mass- (present on admission)  Assessment & Plan  Outside CT scan and Ultrasound of the neck describing a circumscribed mass lesion with central fluid which could be a abscess versus lymph node versus cancer  ENT (Dr. Fernandes) consulted  -Continue clindamycin for now  -Plan for FNA biopsy on 3/10.  If neoplasm ruled out, ENT will consider I&D  -Pain control    Leukocytosis  Assessment & Plan  WBC count increasing, 14.3 (3/10/2025).  Possibly reactive versus 2/2 neck mass (possible infection).  -CTM on a.m. labs, no antibiotics indicated for the time being.    Hypokalemia  Assessment & Plan  Potassium 3.5, repleted with K-Dur 20 mEq  - Continue to monitor on a.m. labs, replete as needed.    Migraines  Assessment & Plan  Patient not presenting with any migraines  - Continue home pain medications.    Hypothyroidism  Assessment &  Plan  - Continue home Levothyroxine 50mcg.    VTE prophylaxis: SCDs/TEDs    I have performed a physical exam and reviewed and updated ROS and Plan today (3/10/2025). In review of yesterday's note (3/9/2025), there are no changes except as documented above.

## 2025-03-10 NOTE — DISCHARGE INSTRUCTIONS
Activity:  Avoid strenuous activities and any movements that exacerbate your neck pain.  Engage in gentle neck exercises as tolerated to maintain range of motion, but stop if any movement causes pain.    Diet:  Continue with your regular diet. Ensure adequate hydration and balanced nutrition to support recovery.    Wound Care:  If you notice any changes in the neck mass, such as increased redness, swelling, warmth, or discharge, contact your healthcare provider immediately.    Follow-Up Appointments:  ENT Specialist: Schedule an appointment to discuss the results of your fine-needle aspiration biopsy and to plan further treatment. It is crucial to attend this appointment to determine the next steps in your care.  Primary Care Physician: Follow up to monitor your overall health and manage any other ongoing medical conditions.    When to Seek Medical Attention:  If you experience any new or worsening symptoms such as fever, chills, significant neck swelling, difficulty swallowing, or breathing difficulties.  If you have any signs of an allergic reaction to your medication, such as rash, itching, or difficulty breathing.    Additional Instructions:  Keep a record of any new symptoms or changes in the neck mass to discuss with your healthcare providers during follow-up visits.  Ensure you have transportation arranged for your follow-up appointments and any necessary lab tests.    Emergency Contact:  In case of an emergency, call 911 or go to the nearest emergency department.

## 2025-03-10 NOTE — CARE PLAN
The patient is Stable - Low risk of patient condition declining or worsening    Shift Goals  Clinical Goals: abx, NPO after midnight, monitor VS, pain management  Patient Goals: rest, NPO afetr midnight, DC tomorrow  Family Goals: YANCY    Progress made toward(s) clinical / shift goals:    Problem: Pain - Standard  Goal: Alleviation of pain or a reduction in pain to the patient’s comfort goal  Description: Target End Date:  Prior to discharge or change in level of care    Document on Vitals flowsheet    1.  Document pain using the appropriate pain scale per order or unit policy  2.  Educate and implement non-pharmacologic comfort measures (i.e. relaxation, distraction, massage, cold/heat therapy, etc.)  3.  Pain management medications as ordered  4.  Reassess pain after pain med administration per policy  5.  If opiods administered assess patient's response to pain medication is appropriate per POSS sedation scale  6.  Follow pain management plan developed in collaboration with patient and interdisciplinary team (including palliative care or pain specialists if applicable)  3/10/2025 0357 by Rachelle Luciano R.N.    Outcome: NProgressing  Note: Pt in 8 and 9/10 pain, responding to Oxycodone 5 mg po which pt received twice during night shift.      Problem: Nutrition  Goal: Patient's nutritional and fluid intake will be adequate or improve  Description: Target End Date:  Prior to discharge or change in level of care    Document on I/O flowsheet    1.  Monitor nutritional intake  2.  Monitor weight per provider order  3.  Assess patient's ability to take oral nutrition  4.  Collaborate with Speech Therapy, Dietitian and interdisciplinary team for appropriate feeding and fluid intake  5.  Assist with feeding  Outcome: Progressing  Note: Pt NPO for fine needle aspiration in the morning.

## 2025-03-10 NOTE — DISCHARGE SUMMARY
[facility] Discharge Summary    Attending: Titus Nieves M.d.  Senior Resident: Dr. Benitez  Intern:  Dr. Davila  Contact Number: [phone]    CHIEF COMPLAINT ON ADMISSION  Chief Complaint   Patient presents with    Sent by MD [facility] transfer from last night for ENT follow up. Neck swelling for 2 weeks.      Reason for Admission  sent by md; neck swelling     Admission Date  3/7/2025    CODE STATUS  Full Code    HPI & HOSPITAL COURSE  Marilyn Feliciano, a 74-year-old female with a history of migraines and hypothyroidism, was transferred from [place] due to a progressively enlarging mass over the right side of her neck, which began approximately two weeks prior to admission. She reported tenderness to palpation and discomfort with neck extension but denied other systemic symptoms such as fever, chills, or swallowing difficulties.    Emergency Department Course:  In the ED, her CBC showed a white blood cell count of 11.8 with 72% neutrophils. An outside CT scan and ultrasound indicated a circumscribed mass lesion with central fluid, suggestive of an abscess versus lymph node versus cancer. The ED team initiated Clindamycin due to an Unasyn shortage and consulted ENT, who planned to evaluate the patient.    Hospital Course:  03/08/2025:  The patient continued to experience neck pain and mild sore throat but denied significant systemic symptoms. Dr. Fernandes was consulted and recommended an interventional radiology-guided fine-needle aspiration biopsy for definitive diagnosis, planned for 03/10/2025. Clindamycin was continued, and the patient was instructed to remain NPO from midnight on 03/09/2025.    03/09/2025:  The patient reported improved pain control, with no fever or chills overnight.     03/10/2025:  The patient expressed frustration about her hospital stay but was counseled on the necessity of the FNAB to determine the nature of the neck mass. Her WBC count increased to 14.3. The FNAB  was performed, and the patient expressed a desire to follow up with ENT as an outpatient for further management based on biopsy results.    Therefore, she is discharged in fair and stable condition to home with close outpatient follow-up.    The patient met 2-midnight criteria for an inpatient stay at the time of discharge.    Discharge Date  03/10/25    Physical Exam on Day of Discharge  Physical Exam  HENT:      Head: Normocephalic.      Right Ear: External ear normal.      Left Ear: External ear normal.      Mouth/Throat:      Mouth: Mucous membranes are moist.      Pharynx: No oropharyngeal exudate or posterior oropharyngeal erythema.   Neck:      Comments: Tender area from anterolateral aspect of right side of the neck, extending to behind the ear that spans from her submandibular area down to right clavicle. (+) induration. No sinus tracts inspected  Cardiovascular:      Rate and Rhythm: Normal rate and regular rhythm.   Pulmonary:      Breath sounds: Normal breath sounds.   Abdominal:      General: Abdomen is flat.      Palpations: Abdomen is soft.   Musculoskeletal:         General: Normal range of motion.      Cervical back: Normal range of motion. Tenderness present.   Lymphadenopathy:      Cervical: No cervical adenopathy.   Skin:     Findings: Erythema present.   Neurological:      Mental Status: She is alert and oriented to person, place, and time.   Psychiatric:         Mood and Affect: Mood normal.       FOLLOW UP ITEMS POST DISCHARGE  Follow-up with outpatient ENT in 1-2 weeks  Follow-up with primary care physician 1-2 weeks    DISCHARGE DIAGNOSES  Principal Problem:    Neck mass (POA: Yes)  Active Problems:    Hypothyroidism (POA: Unknown)    Migraines (POA: Unknown)    Hypokalemia (POA: Unknown)    Leukocytosis (POA: Unknown)  Resolved Problems:    * No resolved hospital problems. *    FOLLOW UP  No future appointments.  No follow-up provider specified.    MEDICATIONS ON DISCHARGE     Medication  List        START taking these medications        Instructions   clindamycin 300 MG Caps  Commonly known as: Cleocin   Take 1 Capsule by mouth 3 times a day for 12 days.  Dose: 300 mg     oxyCODONE immediate-release 5 MG Tabs  Commonly known as: Roxicodone   Take 1 Tablet by mouth every 6 hours as needed for Severe Pain for up to 3 days.  Dose: 5 mg            CONTINUE taking these medications        Instructions   Advil Liqui-Gels minis 200 MG Caps  Generic drug: Ibuprofen   Take 400-600 mg by mouth every 6 hours as needed (Mild to Moderate Pain). 2 to 3 capsules = 400 to 600 mg.  Dose: 400-600 mg     Fioricet/Codeine -06-30 MG Caps  Generic drug: Butalbital-APAP-Caff-Cod   Take 1 Capsule by mouth every 6 hours as needed (Migraine).  Dose: 1 Capsule     levothyroxine 50 MCG Tabs  Commonly known as: Synthroid   Take 50 mcg by mouth every morning on an empty stomach.  Dose: 50 mcg     propranolol 40 MG Tabs  Commonly known as: Inderal   Take 40-80 mg by mouth 2 times a day. Take 1 tablet (40 mg) in the morning and 2 tablets (80 mg) in the evening.  Dose: 40-80 mg     Topamax 50 MG tablet  Generic drug: topiramate   Take 50 mg by mouth 2 times a day.  Dose: 50 mg     Tylenol PM Extra Strength 500-25 MG Tabs  Generic drug: diphenhydrAMINE-APAP (sleep)   Take 2 Tablets by mouth at bedtime as needed (Mild Pain, Sleep).  Dose: 2 Tablet     zolmitriptan 5 MG tablet  Commonly known as: Zomig   Take 5 mg by mouth 1 time a day as needed for Migraine.  Dose: 5 mg            Allergies  No Known Allergies    DIET  Orders Placed This Encounter   Procedures    Diet NPO Restrict to: Sips with Medications     Standing Status:   Standing     Number of Occurrences:   8     Diet NPO Restrict to::   Sips with Medications [3]     ACTIVITY  As tolerated.  Weight bearing as tolerated    CONSULTATIONS  ENT  Interventional radiology    PROCEDURES  IR biopsy and drainage (03/10/2025)    LABORATORY  Lab Results   Component Value Date     SODIUM 140 03/10/2025    POTASSIUM 3.5 (L) 03/10/2025    CHLORIDE 107 03/10/2025    CO2 21 03/10/2025    GLUCOSE 93 03/10/2025    BUN 18 03/10/2025    CREATININE 0.69 03/10/2025        Lab Results   Component Value Date    WBC 14.3 (H) 03/10/2025    HEMOGLOBIN 13.5 03/10/2025    HEMATOCRIT 41.4 03/10/2025    PLATELETCT 254 03/10/2025      Total time of the discharge process exceeds 31 minutes.

## 2025-03-10 NOTE — CARE PLAN
The patient is Stable - Low risk of patient condition declining or worsening    Shift Goals  Clinical Goals: Pain control, ENT collaboration  Patient Goals: Pain management, updates  Family Goals: YANCY    Progress made toward(s) clinical / shift goals:      Problem: Pain - Standard  Goal: Alleviation of pain or a reduction in pain to the patient’s comfort goal  Outcome: Progressing  Note: Pt pain assessed using appropriate pain scale. Education given on nonpharmacologic comfort measures. Pain management medications administered as ordered and reassessed per policy. Pain management plan developed in collaboration with patient and interdisciplinary team.  Pt receiving PRN ibuprofen and scheduled acetaminophen for pain.      Problem: Knowledge Deficit - Standard  Goal: Patient and family/care givers will demonstrate understanding of plan of care, disease process/condition, diagnostic tests and medications  Outcome: Progressing  Note: Pt and family oriented to the unit, equipment, and policies. Learning assessment completed. Education provided on all medications, treatments, and plan of care. Pt verbalized understanding of education.       Problem: Infection - Standard  Goal: Patient will remain free from infection  Outcome: Progressing  Note: Standard precautions used at all times to reduce the risk of infection transmission and pt educated on proper infection prevention strategies.

## 2025-03-10 NOTE — PROGRESS NOTES
US guided right neck mass core/aspiration biopsy and culture done by Dr. Burgess; NON-SEDATION (no H&P required as this is NON SEDATION procedure) Right neck access site, dressing CDI. 1 jar Formalin. I sterile syringe obtained and sent to lab. Pt tolerated the procedure well. Pt hemodynamically stable pre/intra/post procedure; all questions and concerns answered prior to being transported back to room. Funmilayo WALTON given report.

## 2025-03-10 NOTE — ASSESSMENT & PLAN NOTE
WBC count increasing, 14.3 (3/10/2025).  Possibly reactive versus 2/2 neck mass (possible infection).  -CTM on a.m. labs, no antibiotics indicated for the time being.

## 2025-03-11 NOTE — PROGRESS NOTES
Marilyn Feliciano has been provided discharge instructions, to include follow up care, home medications, and activity/diet reviewed. Copy of discharge instructions in patient chart, signed and reviewed. Patient verbalizes the understanding of the discharge instructions. Arm band removed. Patient did have home meds during admit.,returned to patient at bedside. Meds to Beds given at bedside. Questions and concerns addressed prior to leaving the discharge lounge. Transported via car, accompanied by family. Patient discharge to home.

## 2025-03-12 ENCOUNTER — RESULTS FOLLOW-UP (OUTPATIENT)
Dept: HOSPITALIST | Facility: MEDICAL CENTER | Age: 75
End: 2025-03-12

## 2025-03-12 LAB
BACTERIA WND AEROBE CULT: ABNORMAL
BACTERIA WND AEROBE CULT: ABNORMAL
GRAM STN SPEC: ABNORMAL
SIGNIFICANT IND 70042: ABNORMAL
SITE SITE: ABNORMAL
SOURCE SOURCE: ABNORMAL